# Patient Record
Sex: FEMALE | Race: WHITE | NOT HISPANIC OR LATINO | Employment: UNEMPLOYED | URBAN - NONMETROPOLITAN AREA
[De-identification: names, ages, dates, MRNs, and addresses within clinical notes are randomized per-mention and may not be internally consistent; named-entity substitution may affect disease eponyms.]

---

## 2019-11-21 ENCOUNTER — OFFICE VISIT (OUTPATIENT)
Dept: CARDIOLOGY | Facility: CLINIC | Age: 54
End: 2019-11-21

## 2019-11-21 VITALS
HEART RATE: 76 BPM | WEIGHT: 265 LBS | HEIGHT: 65 IN | BODY MASS INDEX: 44.15 KG/M2 | DIASTOLIC BLOOD PRESSURE: 81 MMHG | SYSTOLIC BLOOD PRESSURE: 125 MMHG | OXYGEN SATURATION: 98 %

## 2019-11-21 DIAGNOSIS — I10 ESSENTIAL HYPERTENSION: ICD-10-CM

## 2019-11-21 DIAGNOSIS — R05.9 COUGH: ICD-10-CM

## 2019-11-21 DIAGNOSIS — R53.82 CHRONIC FATIGUE: ICD-10-CM

## 2019-11-21 DIAGNOSIS — R00.2 PALPITATIONS: ICD-10-CM

## 2019-11-21 DIAGNOSIS — R07.2 PRECORDIAL PAIN: Primary | ICD-10-CM

## 2019-11-21 DIAGNOSIS — R06.02 SHORTNESS OF BREATH: ICD-10-CM

## 2019-11-21 PROCEDURE — 93000 ELECTROCARDIOGRAM COMPLETE: CPT | Performed by: NURSE PRACTITIONER

## 2019-11-21 PROCEDURE — 99203 OFFICE O/P NEW LOW 30 MIN: CPT | Performed by: NURSE PRACTITIONER

## 2019-11-21 RX ORDER — NITROGLYCERIN 0.4 MG/1
TABLET SUBLINGUAL
Qty: 30 TABLET | Refills: 5 | Status: SHIPPED | OUTPATIENT
Start: 2019-11-21

## 2019-11-21 RX ORDER — LEVOTHYROXINE SODIUM 175 UG/1
CAPSULE ORAL
COMMUNITY
End: 2019-11-21

## 2019-11-21 RX ORDER — ESCITALOPRAM OXALATE 10 MG/1
TABLET ORAL
Refills: 2 | COMMUNITY
Start: 2019-10-25 | End: 2020-01-15 | Stop reason: SDUPTHER

## 2019-11-21 RX ORDER — TIZANIDINE 4 MG/1
TABLET ORAL EVERY 6 HOURS
COMMUNITY
End: 2019-11-21

## 2019-11-21 RX ORDER — TIZANIDINE 4 MG/1
TABLET ORAL
Refills: 0 | COMMUNITY
Start: 2019-09-16 | End: 2019-11-21

## 2019-11-21 RX ORDER — HYDROXYZINE HYDROCHLORIDE 10 MG/1
TABLET, FILM COATED ORAL
Refills: 2 | COMMUNITY
Start: 2019-10-25

## 2019-11-21 RX ORDER — LEVOTHYROXINE SODIUM 175 UG/1
175 TABLET ORAL DAILY
Refills: 5 | COMMUNITY
Start: 2019-10-25

## 2019-11-21 RX ORDER — FUROSEMIDE 40 MG/1
TABLET ORAL
Refills: 5 | COMMUNITY
Start: 2019-10-25

## 2019-11-21 RX ORDER — ISOSORBIDE MONONITRATE 30 MG/1
30 TABLET, EXTENDED RELEASE ORAL DAILY
Qty: 30 TABLET | Refills: 11 | Status: SHIPPED | OUTPATIENT
Start: 2019-11-21 | End: 2020-01-15

## 2019-11-21 NOTE — PROGRESS NOTES
Subjective     Dara Lunsford is a 54 y.o. female who presents to day for Fatigue (x one yr, worse the past 4-5 months) and Abnormal ECG (per Cathryn Solano MD).    CHIEF COMPLIANT  Chief Complaint   Patient presents with   • Fatigue     x one yr, worse the past 4-5 months   • Abnormal ECG     per Cathryn Solano MD     Problem List  Fatigue  Dizziness  Chest Pain      Active Problems:  Problem List Items Addressed This Visit     None      Visit Diagnoses     Precordial pain    -  Primary    Relevant Medications    nitroglycerin (NITROSTAT) 0.4 MG SL tablet    isosorbide mononitrate (IMDUR) 30 MG 24 hr tablet    Other Relevant Orders    Stress Test With Myocardial Perfusion One Day    Adult Transthoracic Echo Complete W/ Cont if Necessary Per Protocol    Holter Monitor - 24 Hour    Palpitations        Relevant Orders    Stress Test With Myocardial Perfusion One Day    Adult Transthoracic Echo Complete W/ Cont if Necessary Per Protocol    Holter Monitor - 24 Hour    Essential hypertension        Relevant Medications    furosemide (LASIX) 40 MG tablet    Other Relevant Orders    Stress Test With Myocardial Perfusion One Day    Adult Transthoracic Echo Complete W/ Cont if Necessary Per Protocol    Holter Monitor - 24 Hour    Shortness of breath        Relevant Orders    Stress Test With Myocardial Perfusion One Day    Adult Transthoracic Echo Complete W/ Cont if Necessary Per Protocol    Holter Monitor - 24 Hour    Chronic fatigue        Relevant Orders    Stress Test With Myocardial Perfusion One Day    Adult Transthoracic Echo Complete W/ Cont if Necessary Per Protocol    Holter Monitor - 24 Hour    Cough        Relevant Orders    Stress Test With Myocardial Perfusion One Day    Adult Transthoracic Echo Complete W/ Cont if Necessary Per Protocol    Holter Monitor - 24 Hour          HPI  HPI  Ms. Lunsford is a 54-year-old female who is being seen today for increasing fatigue and abnormal EKG.  Patient stated that she  "has no energy for the last 4 to 5 months and that her fatigue has become significant amount worse.  States that she does falls asleep if she is sitting still.  States that she even has to make herself take a shower and that she can even make it to the store anymore.  Complains of midsternal chest pain that is stat and feels as if there is a block on her chest.  States that appetite but he makes it worse.  And at times \"water swishing in my chest\" nothing seems to make the pain better when it occurs.  Pain does occur daily.  Also adds that she has shortness of breath is been present for years and occurs sometimes at rest.  States that when she is walking feels like not getting nowhere and as if she is going to pass out.  Also for the past 3 to 4 months she verbalized that she has become nauseated without vomiting and has a decreased appetite.  States that she has muscle cramps really bad now in the left side of her chest cramps and having to stop and even had to pull her car over at one point in time and stand up and stretch to try to alleviate the cramps.  Her leg swelling does not go away at night says that she is been having this for years and she has been on Lasix.  In addition she has been having a cough and she coughs to the point where she becomes near syncopal.  She does have a productive clear to green sputum.  Has a history of sleep apnea in which she does not use a CPAP for she has been followed by Dr. Hall.  She was diagnosed with PD and ever since everything seems to be getting worse.  Denies any PND, orthopnea, syncope, or other neurological changes.    PRIOR MEDS  Current Outpatient Medications on File Prior to Visit   Medication Sig Dispense Refill   • escitalopram (LEXAPRO) 10 MG tablet one tablet daily  2   • furosemide (LASIX) 40 MG tablet TAKE ONE TABLET PO PRN  5   • hydrOXYzine (ATARAX) 10 MG tablet take one tablet daily  2   • levothyroxine (SYNTHROID, LEVOTHROID) 175 MCG tablet Take 175 mcg " "by mouth Daily.  5     No current facility-administered medications on file prior to visit.        ALLERGIES  Penicillins    HISTORY  Past Medical History:   Diagnosis Date   • Anxiety    • Hypothyroidism    • Stroke (cerebrum) (CMS/[facility]) 2016   • TIA (transient ischemic attack)        Social History     Socioeconomic History   • Marital status:      Spouse name: Not on file   • Number of children: Not on file   • Years of education: Not on file   • Highest education level: Not on file   Tobacco Use   • Smoking status: Never Smoker   • Smokeless tobacco: Never Used   Substance and Sexual Activity   • Alcohol use: No     Frequency: Never   • Drug use: No   • Sexual activity: Defer       Family History   Problem Relation Age of Onset   • Heart disease Mother    • Heart disease Father    • Heart disease Sister    • Heart disease Brother        Review of Systems   Constitutional: Positive for fatigue (worse the past 4-5 months).   Respiratory: Positive for cough, chest tightness and shortness of breath.    Cardiovascular: Positive for chest pain, palpitations and leg swelling.   Gastrointestinal: Positive for nausea (x 3-4 months). Negative for abdominal pain, blood in stool, constipation, diarrhea and vomiting.   Genitourinary: Positive for frequency. Negative for difficulty urinating, dysuria, hematuria and urgency.   Musculoskeletal: Positive for back pain and neck pain.   Neurological: Positive for dizziness and light-headedness. Negative for syncope.   Hematological: Bruises/bleeds easily.   Psychiatric/Behavioral: Positive for sleep disturbance (wakes with soa and cp, states happened this am).       Objective     VITALS: /81 (BP Location: Left arm, Patient Position: Sitting)   Pulse 76   Ht 165.1 cm (65\")   Wt 120 kg (265 lb)   SpO2 98%   BMI 44.10 kg/m²     LABS:   Lab Results (most recent)     None          IMAGING:   No Images in the past 120 days found..    EXAM:  Physical Exam "   Constitutional: She is oriented to person, place, and time. Vital signs are normal. She appears well-developed and well-nourished.   HENT:   Head: Normocephalic and atraumatic.   Eyes: EOM are normal. Pupils are equal, round, and reactive to light.   Neck: Trachea normal. Neck supple. No JVD present. Carotid bruit is not present. No thyromegaly present.   Cardiovascular: Normal rate, regular rhythm, normal heart sounds and intact distal pulses. Exam reveals no gallop and no friction rub.   No murmur heard.  Pulmonary/Chest: Effort normal and breath sounds normal. No respiratory distress.   Abdominal: Soft. Bowel sounds are normal. She exhibits no distension and no abdominal bruit. There is no tenderness. There is no guarding.   Musculoskeletal: Normal range of motion. She exhibits edema (trace).   Neurological: She is alert and oriented to person, place, and time. She has normal strength. No cranial nerve deficit or sensory deficit.   Skin: Skin is warm, dry and intact.   Psychiatric: She has a normal mood and affect. Her behavior is normal. Judgment and thought content normal.   Nursing note and vitals reviewed.      Procedure     ECG 12 Lead  Date/Time: 11/21/2019 12:24 PM  Performed by: Guido Valentino APRN  Authorized by: Guido Valentino APRN   Comparison: compared with previous ECG from 10/25/2019  Similar to previous ECG  Rhythm: sinus rhythm  Rate: normal  BPM: 66  ST Depression: V5 and V6  QRS axis: normal  Other findings: non-specific ST-T wave changes    Clinical impression: abnormal EKG               Assessment/Plan    Diagnosis Plan   1. Precordial pain  Stress Test With Myocardial Perfusion One Day    Adult Transthoracic Echo Complete W/ Cont if Necessary Per Protocol    Holter Monitor - 24 Hour    nitroglycerin (NITROSTAT) 0.4 MG SL tablet    isosorbide mononitrate (IMDUR) 30 MG 24 hr tablet   2. Palpitations  Stress Test With Myocardial Perfusion One Day    Adult Transthoracic Echo Complete W/  Cont if Necessary Per Protocol    Holter Monitor - 24 Hour   3. Essential hypertension  Stress Test With Myocardial Perfusion One Day    Adult Transthoracic Echo Complete W/ Cont if Necessary Per Protocol    Holter Monitor - 24 Hour   4. Shortness of breath  Stress Test With Myocardial Perfusion One Day    Adult Transthoracic Echo Complete W/ Cont if Necessary Per Protocol    Holter Monitor - 24 Hour   5. Chronic fatigue  Stress Test With Myocardial Perfusion One Day    Adult Transthoracic Echo Complete W/ Cont if Necessary Per Protocol    Holter Monitor - 24 Hour   6. Cough  Stress Test With Myocardial Perfusion One Day    Adult Transthoracic Echo Complete W/ Cont if Necessary Per Protocol    Holter Monitor - 24 Hour   1.  Patient had a significant increase in fatigue, chest pain, and shortness of air over the last 4 to 5 months.  Due to patient's comorbidities and symptoms I feel is reasonable to have patient undergo stress and echo.  Procedures explained to patient and patient wishes to proceed with test.  2.  We will place patient on isosorbide 30 mg daily in order to help reduce angina.  We will also prescribe nitroglycerin 0.4 mg sublingual up to 3 doses 5 minutes apart patient also advised to go to the emergency department immediately if pain is not relieved by nitroglycerin by third dose.  3.  Will order Holter monitor in order to determine etiology of palpitations.  Patient had lab work performed at Dr. Solano a couple of weeks ago.  We will try to obtain those labs to further evaluate essential cause of palpitations.  4.  Patient informed of signs and symptoms of ACS and advised to seek emergent treatment for any new worsening symptoms.  Patient also advised to seek sooner follow-up as needed.    Labs from [facility] on 10/25/2019 was reviewed and identified a relatively normal CBC, mildly elevated creatinine at 1.4, and elevated total cholesterol 220, triglycerides of 127, HDL 49, LDL  of 146.6, hemoglobin A1c of 5.5, BNP 24.1 and a extremely elevated TSH of 152.3.    Return in about 3 months (around 2/21/2020), or if symptoms worsen or fail to improve.    Dara was seen today for fatigue and abnormal ecg.    Diagnoses and all orders for this visit:    Precordial pain  -     Stress Test With Myocardial Perfusion One Day; Future  -     Adult Transthoracic Echo Complete W/ Cont if Necessary Per Protocol; Future  -     Holter Monitor - 24 Hour; Future  -     nitroglycerin (NITROSTAT) 0.4 MG SL tablet; 1 under the tongue as needed for angina, may repeat q5mins for up three doses  -     isosorbide mononitrate (IMDUR) 30 MG 24 hr tablet; Take 1 tablet by mouth Daily.    Palpitations  -     Stress Test With Myocardial Perfusion One Day; Future  -     Adult Transthoracic Echo Complete W/ Cont if Necessary Per Protocol; Future  -     Holter Monitor - 24 Hour; Future    Essential hypertension  -     Stress Test With Myocardial Perfusion One Day; Future  -     Adult Transthoracic Echo Complete W/ Cont if Necessary Per Protocol; Future  -     Holter Monitor - 24 Hour; Future    Shortness of breath  -     Stress Test With Myocardial Perfusion One Day; Future  -     Adult Transthoracic Echo Complete W/ Cont if Necessary Per Protocol; Future  -     Holter Monitor - 24 Hour; Future    Chronic fatigue  -     Stress Test With Myocardial Perfusion One Day; Future  -     Adult Transthoracic Echo Complete W/ Cont if Necessary Per Protocol; Future  -     Holter Monitor - 24 Hour; Future    Cough  -     Stress Test With Myocardial Perfusion One Day; Future  -     Adult Transthoracic Echo Complete W/ Cont if Necessary Per Protocol; Future  -     Holter Monitor - 24 Hour; Future                  Patient's Body mass index is 44.1 kg/m². BMI is above normal parameters. Recommendations include: educational material.           MEDS ORDERED DURING VISIT:  New Medications Ordered This Visit   Medications   • nitroglycerin  (NITROSTAT) 0.4 MG SL tablet     Si under the tongue as needed for angina, may repeat q5mins for up three doses     Dispense:  30 tablet     Refill:  5   • isosorbide mononitrate (IMDUR) 30 MG 24 hr tablet     Sig: Take 1 tablet by mouth Daily.     Dispense:  30 tablet     Refill:  11           This document has been electronically signed by Guido Valentino Jr., APRN  2019 10:35 PM

## 2019-11-21 NOTE — PATIENT INSTRUCTIONS
"Fat and Cholesterol Restricted Eating Plan  Getting too much fat and cholesterol in your diet may cause health problems. Choosing the right foods helps keep your fat and cholesterol at normal levels. This can keep you from getting certain diseases.  Your doctor may recommend an eating plan that includes:  · Total fat: ______% or less of total calories a day.  · Saturated fat: ______% or less of total calories a day.  · Cholesterol: less than _________mg a day.  · Fiber: ______g a day.  What are tips for following this plan?  Meal planning  · At meals, divide your plate into four equal parts:  ? Fill one-half of your plate with vegetables and green salads.  ? Fill one-fourth of your plate with whole grains.  ? Fill one-fourth of your plate with low-fat (lean) protein foods.  · Eat fish that is high in omega-3 fats at least two times a week. This includes mackerel, tuna, sardines, and salmon.  · Eat foods that are high in fiber, such as whole grains, beans, apples, broccoli, carrots, peas, and barley.  General tips    · Work with your doctor to lose weight if you need to.  · Avoid:  ? Foods with added sugar.  ? Fried foods.  ? Foods with partially hydrogenated oils.  · Limit alcohol intake to no more than 1 drink a day for nonpregnant women and 2 drinks a day for men. One drink equals 12 oz of beer, 5 oz of wine, or 1½ oz of hard liquor.  Reading food labels  · Check food labels for:  ? Trans fats.  ? Partially hydrogenated oils.  ? Saturated fat (g) in each serving.  ? Cholesterol (mg) in each serving.  ? Fiber (g) in each serving.  · Choose foods with healthy fats, such as:  ? Monounsaturated fats.  ? Polyunsaturated fats.  ? Omega-3 fats.  · Choose grain products that have whole grains. Look for the word \"whole\" as the first word in the ingredient list.  Cooking  · Cook foods using low-fat methods. These include baking, boiling, grilling, and broiling.  · Eat more home-cooked foods. Eat at restaurants and buffets " less often.  · Avoid cooking using saturated fats, such as butter, cream, palm oil, palm kernel oil, and coconut oil.  Recommended foods    Fruits  · All fresh, canned (in natural juice), or frozen fruits.  Vegetables  · Fresh or frozen vegetables (raw, steamed, roasted, or grilled). Green salads.  Grains  · Whole grains, such as whole wheat or whole grain breads, crackers, cereals, and pasta. Unsweetened oatmeal, bulgur, barley, quinoa, or brown rice. Corn or whole wheat flour tortillas.  Meats and other protein foods  · Ground beef (85% or leaner), grass-fed beef, or beef trimmed of fat. Skinless chicken or turkey. Ground chicken or turkey. Pork trimmed of fat. All fish and seafood. Egg whites. Dried beans, peas, or lentils. Unsalted nuts or seeds. Unsalted canned beans. Nut butters without added sugar or oil.  Dairy  · Low-fat or nonfat dairy products, such as skim or 1% milk, 2% or reduced-fat cheeses, low-fat and fat-free ricotta or cottage cheese, or plain low-fat and nonfat yogurt.  Fats and oils  · Tub margarine without trans fats. Light or reduced-fat mayonnaise and salad dressings. Avocado. Olive, canola, sesame, or safflower oils.  The items listed above may not be a complete list of foods and beverages you can eat. Contact a dietitian for more information.  Foods to avoid  Fruits  · Canned fruit in heavy syrup. Fruit in cream or butter sauce. Fried fruit.  Vegetables  · Vegetables cooked in cheese, cream, or butter sauce. Fried vegetables.  Grains  · White bread. White pasta. White rice. Cornbread. Bagels, pastries, and croissants. Crackers and snack foods that contain trans fat and hydrogenated oils.  Meats and other protein foods  · Fatty cuts of meat. Ribs, chicken wings, garcia, sausage, bologna, salami, chitterlings, fatback, hot dogs, bratwurst, and packaged lunch meats. Liver and organ meats. Whole eggs and egg yolks. Chicken and turkey with skin. Fried meat.  Dairy  · Whole or 2% milk, cream,  half-and-half, and cream cheese. Whole milk cheeses. Whole-fat or sweetened yogurt. Full-fat cheeses. Nondairy creamers and whipped toppings. Processed cheese, cheese spreads, and cheese curds.  Beverages  · Alcohol. Sugar-sweetened drinks such as sodas, lemonade, and fruit drinks.  Fats and oils  · Butter, stick margarine, lard, shortening, ghee, or garcia fat. Coconut, palm kernel, and palm oils.  Sweets and desserts  · Corn syrup, sugars, honey, and molasses. Candy. Jam and jelly. Syrup. Sweetened cereals. Cookies, pies, cakes, donuts, muffins, and ice cream.  The items listed above may not be a complete list of foods and beverages you should avoid. Contact a dietitian for more information.  Summary  · Choosing the right foods helps keep your fat and cholesterol at normal levels. This can keep you from getting certain diseases.  · At meals, fill one-half of your plate with vegetables and green salads.  · Eat high-fiber foods, like whole grains, beans, apples, carrots, peas, and barley.  · Limit added sugar, saturated fats, alcohol, and fried foods.  This information is not intended to replace advice given to you by your health care provider. Make sure you discuss any questions you have with your health care provider.  Document Released: 06/18/2013 Document Revised: 08/21/2019 Document Reviewed: 09/04/2018  [employer] Interactive Patient Education © 2019 [employer]  BMI for Adults    Body mass index (BMI) is a number that is calculated from a person's weight and height. BMI may help to estimate how much of a person's weight is composed of fat. BMI can help identify those who may be at higher risk for certain medical problems.  How is BMI used with adults?  BMI is used as a screening tool to identify possible weight problems. It is used to check whether a person is obese, overweight, healthy weight, or underweight.  How is BMI calculated?  BMI measures your weight and compares it to your height. This can be done  "either in English ([place] or metric measurements. Note that charts are available to help you find your BMI quickly and easily without having to do these calculations yourself.  To calculate your BMI in English ([place] measurements, your health care provider will:  1. Measure your weight in pounds (lb).  2. Multiply the number of pounds by 703.  ? For example, for a person who weighs 180 lb, multiply that number by 703, which equals 126,540.  3. Measure your height in inches (in). Then multiply that number by itself to get a measurement called \"inches squared.\"  ? For example, for a person who is 70 in tall, the \"inches squared\" measurement is 70 in x 70 in, which equals 4900 inches squared.  4. Divide the total from Step 2 (number of lb x 703) by the total from Step 3 (inches squared): 126,540 ÷ 4900 = 25.8. This is your BMI.  To calculate your BMI in metric measurements, your health care provider will:  1. Measure your weight in kilograms (kg).  2. Measure your height in meters (m). Then multiply that number by itself to get a measurement called \"meters squared.\"  ? For example, for a person who is 1.75 m tall, the \"meters squared\" measurement is 1.75 m x 1.75 m, which is equal to 3.1 meters squared.  3. Divide the number of kilograms (your weight) by the meters squared number. In this example: 70 ÷ 3.1 = 22.6. This is your BMI.  How is BMI interpreted?  To interpret your results, your health care provider will use BMI charts to identify whether you are underweight, normal weight, overweight, or obese. The following guidelines will be used:  · Underweight: BMI less than 18.5.  · Normal weight: BMI between 18.5 and 24.9.  · Overweight: BMI between 25 and 29.9.  · Obese: BMI of 30 and above.  Please note:  · Weight includes both fat and muscle, so someone with a muscular build, such as an athlete, may have a BMI that is higher than 24.9. In cases like these, BMI is not an accurate measure of body fat.  · To determine " if excess body fat is the cause of a BMI of 25 or higher, further assessments may need to be done by a health care provider.  · BMI is usually interpreted in the same way for men and women.  Why is BMI a useful tool?  BMI is useful in two ways:  · Identifying a weight problem that may be related to a medical condition, or that may increase the risk for medical problems.  · Promoting lifestyle and diet changes in order to reach a healthy weight.  Summary  · Body mass index (BMI) is a number that is calculated from a person's weight and height.  · BMI may help to estimate how much of a person's weight is composed of fat. BMI can help identify those who may be at higher risk for certain medical problems.  · BMI can be measured using English measurements or metric measurements.  · To interpret your results, your health care provider will use BMI charts to identify whether you are underweight, normal weight, overweight, or obese.  This information is not intended to replace advice given to you by your health care provider. Make sure you discuss any questions you have with your health care provider.  Document Released: 08/29/2005 Document Revised: 10/31/2018 Document Reviewed: 10/31/2018  [employer] Interactive Patient Education © 2019 [employer]    Acute Coronary Syndrome    Acute coronary syndrome (ACS) is a serious problem in which there is suddenly not enough blood and oxygen reaching the heart. ACS can result in chest pain or a heart attack.  This condition is a medical emergency. If you have any symptoms of this condition, get help right away.  What are the causes?  This condition may be caused by:  · Buildup of fat and cholesterol inside of the arteries (atherosclerosis). This is the most common cause. The buildup (plaque) can cause blood vessels in the heart (coronary arteries) to become narrow or blocked, which reduces blood flow to the heart. Plaque can also break off and lead to a clot, which can block an  artery and cause a heart attack or stroke.  · Sudden tightening of the muscles around the coronary arteries (coronary spasm).  · Tearing of a coronary artery (spontaneous coronary artery dissection).  · Very low blood pressure (hypotension).  · An abnormal heartbeat (arrhythmia).  · Other medical conditions that cause a decrease of oxygen to the heart, such as anemiaorrespiratory failure.  · Using cocaine or methamphetamine.  What increases the risk?  The following factors may make you more likely to develop this condition:  · Age. The risk for ACS increases as you get older.  · History of chest pain, heart attack, peripheral artery disease, or stroke.  · Having taken chemotherapy or immune-suppressing medicines.  · Being male.  · Family history of chest pain, heart disease, or stroke.  · Smoking.  · Not exercising enough.  · Being overweight.  · High cholesterol.  · High blood pressure (hypertension).  · Diabetes.  · Excessive alcohol use.  What are the signs or symptoms?  Common symptoms of this condition include:  · Chest pain. The pain may last a long time, or it may stop and come back (recur). It may feel like:  ? Crushing or squeezing.  ? Tightness, pressure, fullness, or heaviness.  · Arm, neck, jaw, or back pain.  · Heartburn or indigestion.  · Shortness of breath.  · Nausea.  · Sudden cold sweats.  · Light-headedness.  · Dizziness, or passing out.  · Tiredness (fatigue).  Sometimes there are no symptoms.  How is this diagnosed?  This condition may be diagnosed based on:  · Your medical history and symptoms.  · An electrocardiogram (ECG). This imaging test measures the heart's electrical activity.  · Blood tests. Cardiac blood tests may need to be repeated at designated time intervals.  · Chest X-ray.  · A CT scan of the chest.  · A coronary angiogram. This is a procedure in which dye is injected into the bloodstream and then X-rays are taken to show if there is a blockage in a coronary artery.  · Exercise  stress testing.  · Echocardiography. This is a test that uses sound waves to produce detailed images of the heart.  How is this treated?  The treatment is to restore blood flow to the heart as soon as possible. Treatment for this condition may include:  · Oxygen therapy.  · Medicines, such as:  ? Antiplatelet medicines and blood-thinning medicines, such as aspirin. These help prevent blood clots.  ? Medicine that dissolves any blood clots (fibrinolytic therapy).  ? Blood pressure medicines.  ? Nitroglycerin. This helps relieve chest pain and widens blood vessels to improve blood flow.  ? Pain medicine.  ? Cholesterol-lowering medicine.  · Surgery, such as:  ? Coronary angioplasty with stent placement. This involves placing a small piece of metal that looks like mesh or a spring into a narrow coronary artery. This widens the artery and keep it open.  ? Coronary artery bypass surgery. This involves taking a section of a blood vessel from a different part of your body, and placing it on the blocked coronary artery to allow blood to flow around (bypass) the blockage.  · Cardiac rehabilitation. This is a program that helps improve your health and well-being. It includes exercise training, education, and counseling to help you recover.  Follow these instructions at home:  Eating and drinking  · Eat a heart-healthy diet that includes whole grains, fruits and vegetables, lean proteins, and low-fat or nonfat dairy products.  · Limit how much salt (sodium) you eat as told by your health care provider. Follow instructions from your health care provider about any other eating or drinking restrictions, such as limiting foods that are high in fat and processed sugars.  · Use healthy cooking methods such as roasting, grilling, broiling, baking, poaching, steaming, or stir-frying.  · Talk with a dietitian to learn about healthy cooking methods and how to eat less sodium.  Medicines  · Take over-the-counter and prescription  medicines only as told by your health care provider.  · Do not take these medicines unless your health care provider approves:  ? Vitamin supplements that contain vitamin A or vitamin E.  ? Nonsteroidal anti-inflammatory drugs (NSAIDs), such as ibuprofen, naproxen, or celecoxib.  ? Hormone replacement therapy that contains estrogen.  If you are taking blood thinners:  · Talk with your health care provider before you take any medicines that contain aspirin or NSAIDs. These medicines increase your risk for dangerous bleeding.  · Take your medicine exactly as told, at the same time every day.  · Avoid activities that could cause injury or bruising, and follow instructions about how to prevent falls.  · Wear a medical alert bracelet, and carry a card that lists what medicines you take.  Activity  · Join a cardiac rehabilitation program. An exercise plan will be developed for you.  · Ask your health care provider:  ? What activities and exercises are safe for you.  ? If you should follow specific instructions about lifting, driving, or climbing stairs.  Lifestyle  · Do not use any products that contain nicotine or tobacco, such as cigarettes and e-cigarettes. If you need help quitting, ask your health care provider.  · If your health care provider says that alcohol is safe for you, limit your alcohol intake to no more than 1 drink a day. One drink equals 12 oz of beer, 5 oz of wine, or 1½ oz of hard liquor.  · Maintain a healthy weight. If you need to lose weight, work with your health care provider to do so safely.  General instructions  · Tell all the health care providers who care for you about your heart condition, including your dentist. This may affect the medicines or treatment you receive.  · Manage any other health conditions you have, such as hypertension or diabetes. These conditions affect your heart.  · Learn ways to manage stress.  · Get screened for depression, and get mental health treatment if you need  it. People with ACS are at higher risk for depression.  · Keep your vaccinations up to date. Get the flu shot (influenza vaccine) every year.  · If directed, monitor your blood pressure at home.  · Keep all follow-up visits as told by your health care provider. This is important.  Contact a health care provider if:  · You feel overwhelmed or sad.  · You have trouble doing your daily activities.  Get help right away if:  · You have pain in your chest, neck, arm, jaw, stomach, or back that recurs, and:  ? It lasts for more than a few minutes.  ? It is not relieved by taking the medicineyour health care provider prescribed.  · You have unexplained:  ? Heavy sweating.  ? Heartburn or indigestion.  ? Nausea or vomiting.  ? Shortness of breath.  ? Difficulty breathing.  ? Fatigue.  ? Nervousness or anxiety.  ? Weakness.  ? Diarrhea.  ? Dark stools or blood in your stool.  · You have sudden light-headedness or dizziness.  · Your blood pressure is higher than 180/120.  · You faint.  · You have thoughts about hurting yourself.  These symptoms may represent a serious problem that is an emergency. Do not wait to see if the symptoms will go away. Get medical help right away. Call your local emergency services (134 in the [place] Do not drive yourself to the hospital.   If you ever feel like you may hurt yourself or others, or have thoughts about taking your own life, get help right away. You can go to your nearest emergency department or call:  · Emergency services (583 in the [place]  · A suicide crisis helpline, such as the National Suicide Prevention Lifeline at 1-982.711.2309. This is open 24 hours a day.  Summary  · Acute coronary syndrome (ACS) is when there is not enough blood and oxygen being supplied to the heart. ACS can result in chest pain or a heart attack.  · Acute coronary syndrome is a medical emergency. If you have any symptoms of this condition, get help right away.  · Treatment includes medicines and procedures  to open the blocked arteries and restore blood flow.  This information is not intended to replace advice given to you by your health care provider. Make sure you discuss any questions you have with your health care provider.  Document Released: 12/18/2006 Document Revised: 08/28/2018 Document Reviewed: 08/28/2018  [employer] Interactive Patient Education © 2019 [employer]

## 2019-12-11 ENCOUNTER — APPOINTMENT (OUTPATIENT)
Dept: CARDIOLOGY | Facility: HOSPITAL | Age: 54
End: 2019-12-11

## 2019-12-11 ENCOUNTER — HOSPITAL ENCOUNTER (OUTPATIENT)
Dept: CARDIOLOGY | Facility: HOSPITAL | Age: 54
End: 2019-12-11

## 2020-01-02 ENCOUNTER — HOSPITAL ENCOUNTER (OUTPATIENT)
Dept: CARDIOLOGY | Facility: HOSPITAL | Age: 55
Discharge: HOME | End: 2020-01-02

## 2020-01-02 VITALS — HEIGHT: 65 IN | WEIGHT: 264.55 LBS | BODY MASS INDEX: 44.08 KG/M2

## 2020-01-02 DIAGNOSIS — R53.82 CHRONIC FATIGUE: ICD-10-CM

## 2020-01-02 DIAGNOSIS — R00.2 PALPITATIONS: ICD-10-CM

## 2020-01-02 DIAGNOSIS — I10 ESSENTIAL HYPERTENSION: ICD-10-CM

## 2020-01-02 DIAGNOSIS — R06.02 SHORTNESS OF BREATH: ICD-10-CM

## 2020-01-02 DIAGNOSIS — R05.9 COUGH: ICD-10-CM

## 2020-01-02 DIAGNOSIS — R07.2 PRECORDIAL PAIN: ICD-10-CM

## 2020-01-02 LAB
BH CV STRESS COMMENTS STAGE 1: NORMAL
BH CV STRESS DOSE REGADENOSON STAGE 1: 0.4
BH CV STRESS DURATION MIN STAGE 1: 0
BH CV STRESS DURATION SEC STAGE 1: 10
BH CV STRESS PROTOCOL 1: NORMAL
BH CV STRESS RECOVERY BP: NORMAL MMHG
BH CV STRESS RECOVERY HR: 70 BPM
BH CV STRESS STAGE 1: 1
MAXIMAL PREDICTED HEART RATE: 166 BPM
PERCENT MAX PREDICTED HR: 47.59 %
STRESS BASELINE BP: NORMAL MMHG
STRESS BASELINE HR: 57 BPM
STRESS PERCENT HR: 56 %
STRESS POST PEAK BP: NORMAL MMHG
STRESS POST PEAK HR: 79 BPM
STRESS TARGET HR: 141 BPM

## 2020-01-02 PROCEDURE — 93017 CV STRESS TEST TRACING ONLY: CPT

## 2020-01-02 PROCEDURE — 0 TECHNETIUM SESTAMIBI: Performed by: INTERNAL MEDICINE

## 2020-01-02 PROCEDURE — 25010000002 REGADENOSON 0.4 MG/5ML SOLUTION: Performed by: INTERNAL MEDICINE

## 2020-01-02 PROCEDURE — A9500 TC99M SESTAMIBI: HCPCS | Performed by: INTERNAL MEDICINE

## 2020-01-02 PROCEDURE — 93306 TTE W/DOPPLER COMPLETE: CPT | Performed by: INTERNAL MEDICINE

## 2020-01-02 PROCEDURE — 93016 CV STRESS TEST SUPVJ ONLY: CPT | Performed by: NURSE PRACTITIONER

## 2020-01-02 PROCEDURE — 78452 HT MUSCLE IMAGE SPECT MULT: CPT

## 2020-01-02 PROCEDURE — 93018 CV STRESS TEST I&R ONLY: CPT | Performed by: INTERNAL MEDICINE

## 2020-01-02 PROCEDURE — 93306 TTE W/DOPPLER COMPLETE: CPT

## 2020-01-02 PROCEDURE — 78452 HT MUSCLE IMAGE SPECT MULT: CPT | Performed by: INTERNAL MEDICINE

## 2020-01-02 RX ADMIN — TECHNETIUM TC 99M SESTAMIBI 1 DOSE: 1 INJECTION INTRAVENOUS at 11:05

## 2020-01-02 RX ADMIN — TECHNETIUM TC 99M SESTAMIBI 1 DOSE: 1 INJECTION INTRAVENOUS at 11:14

## 2020-01-02 RX ADMIN — REGADENOSON 0.4 MG: 0.08 INJECTION, SOLUTION INTRAVENOUS at 11:14

## 2020-01-05 LAB
AORTIC DIMENSIONLESS INDEX: 0.9 (DI)
BH CV ECHO MEAS - ACS: 2 CM
BH CV ECHO MEAS - AO MAX PG (FULL): 0.36 MMHG
BH CV ECHO MEAS - AO MAX PG: 3.4 MMHG
BH CV ECHO MEAS - AO MEAN PG (FULL): 1 MMHG
BH CV ECHO MEAS - AO MEAN PG: 2 MMHG
BH CV ECHO MEAS - AO ROOT AREA (BSA CORRECTED): 1.5
BH CV ECHO MEAS - AO ROOT AREA: 8.6 CM^2
BH CV ECHO MEAS - AO ROOT DIAM: 3.3 CM
BH CV ECHO MEAS - AO V2 MAX: 91.8 CM/SEC
BH CV ECHO MEAS - AO V2 MEAN: 59.5 CM/SEC
BH CV ECHO MEAS - AO V2 VTI: 21.8 CM
BH CV ECHO MEAS - BSA(HAYCOCK): 2.4 M^2
BH CV ECHO MEAS - BSA: 2.2 M^2
BH CV ECHO MEAS - BZI_BMI: 44.1 KILOGRAMS/M^2
BH CV ECHO MEAS - BZI_METRIC_HEIGHT: 165.1 CM
BH CV ECHO MEAS - BZI_METRIC_WEIGHT: 120.2 KG
BH CV ECHO MEAS - EDV(CUBED): 60.7 ML
BH CV ECHO MEAS - EDV(MOD-SP4): 88.6 ML
BH CV ECHO MEAS - EDV(TEICH): 67.1 ML
BH CV ECHO MEAS - EF(CUBED): 76.4 %
BH CV ECHO MEAS - EF(MOD-SP4): 53.2 %
BH CV ECHO MEAS - EF(TEICH): 69 %
BH CV ECHO MEAS - ESV(CUBED): 14.3 ML
BH CV ECHO MEAS - ESV(MOD-SP4): 41.5 ML
BH CV ECHO MEAS - ESV(TEICH): 20.8 ML
BH CV ECHO MEAS - FS: 38.2 %
BH CV ECHO MEAS - IVS/LVPW: 0.99
BH CV ECHO MEAS - IVSD: 0.85 CM
BH CV ECHO MEAS - LA DIMENSION(2D): 3.2 CM
BH CV ECHO MEAS - LA DIMENSION: 3.1 CM
BH CV ECHO MEAS - LA/AO: 0.94
BH CV ECHO MEAS - LAT PEAK E' VEL: 6.4 CM/SEC
BH CV ECHO MEAS - LV DIASTOLIC VOL/BSA (35-75): 39.7 ML/M^2
BH CV ECHO MEAS - LV IVRT: 0.12 SEC
BH CV ECHO MEAS - LV MASS(C)D: 99.4 GRAMS
BH CV ECHO MEAS - LV MASS(C)DI: 44.6 GRAMS/M^2
BH CV ECHO MEAS - LV MAX PG: 3 MMHG
BH CV ECHO MEAS - LV MEAN PG: 1 MMHG
BH CV ECHO MEAS - LV SYSTOLIC VOL/BSA (12-30): 18.6 ML/M^2
BH CV ECHO MEAS - LV V1 MAX: 86.8 CM/SEC
BH CV ECHO MEAS - LV V1 MEAN: 49.7 CM/SEC
BH CV ECHO MEAS - LV V1 VTI: 21.2 CM
BH CV ECHO MEAS - LVIDD: 3.9 CM
BH CV ECHO MEAS - LVIDS: 2.4 CM
BH CV ECHO MEAS - LVLD AP4: 7.7 CM
BH CV ECHO MEAS - LVLS AP4: 6 CM
BH CV ECHO MEAS - LVPWD: 0.86 CM
BH CV ECHO MEAS - MED PEAK E' VEL: 6.7 CM/SEC
BH CV ECHO MEAS - MV A MAX VEL: 59.8 CM/SEC
BH CV ECHO MEAS - MV DEC SLOPE: 402 CM/SEC^2
BH CV ECHO MEAS - MV DEC TIME: 0.22 SEC
BH CV ECHO MEAS - MV E MAX VEL: 73.2 CM/SEC
BH CV ECHO MEAS - MV E/A: 1.2
BH CV ECHO MEAS - MV MAX PG: 3.1 MMHG
BH CV ECHO MEAS - MV MEAN PG: 1 MMHG
BH CV ECHO MEAS - MV P1/2T MAX VEL: 97.6 CM/SEC
BH CV ECHO MEAS - MV P1/2T: 71.1 MSEC
BH CV ECHO MEAS - MV V2 MAX: 87.5 CM/SEC
BH CV ECHO MEAS - MV V2 MEAN: 49 CM/SEC
BH CV ECHO MEAS - MV V2 VTI: 29 CM
BH CV ECHO MEAS - MVA P1/2T LCG: 2.3 CM^2
BH CV ECHO MEAS - MVA(P1/2T): 3.1 CM^2
BH CV ECHO MEAS - PA MAX PG (FULL): 1.3 MMHG
BH CV ECHO MEAS - PA MAX PG: 3.1 MMHG
BH CV ECHO MEAS - PA MEAN PG (FULL): 1 MMHG
BH CV ECHO MEAS - PA MEAN PG: 2 MMHG
BH CV ECHO MEAS - PA V2 MAX: 87.5 CM/SEC
BH CV ECHO MEAS - PA V2 MEAN: 63 CM/SEC
BH CV ECHO MEAS - PA V2 VTI: 19.8 CM
BH CV ECHO MEAS - PI END-D VEL: 88.9 CM/SEC
BH CV ECHO MEAS - RAP SYSTOLE: 10 MMHG
BH CV ECHO MEAS - RV MAX PG: 1.8 MMHG
BH CV ECHO MEAS - RV MEAN PG: 1 MMHG
BH CV ECHO MEAS - RV V1 MAX: 66.6 CM/SEC
BH CV ECHO MEAS - RV V1 MEAN: 41.6 CM/SEC
BH CV ECHO MEAS - RV V1 VTI: 15.3 CM
BH CV ECHO MEAS - RVDD: 2.1 CM
BH CV ECHO MEAS - RVSP: 27 MMHG
BH CV ECHO MEAS - SI(AO): 83.6 ML/M^2
BH CV ECHO MEAS - SI(CUBED): 20.8 ML/M^2
BH CV ECHO MEAS - SI(MOD-SP4): 21.1 ML/M^2
BH CV ECHO MEAS - SI(TEICH): 20.8 ML/M^2
BH CV ECHO MEAS - SV(AO): 186.5 ML
BH CV ECHO MEAS - SV(CUBED): 46.3 ML
BH CV ECHO MEAS - SV(MOD-SP4): 47.1 ML
BH CV ECHO MEAS - SV(TEICH): 46.3 ML
BH CV ECHO MEAS - TR MAX VEL: 202 CM/SEC
BH CV ECHO MEASUREMENTS AVERAGE E/E' RATIO: 11.18
LEFT ATRIUM VOLUME INDEX: 20.7 ML/M2
LEFT ATRIUM VOLUME: 46.2 CM3
MAXIMAL PREDICTED HEART RATE: 166 BPM
STRESS TARGET HR: 141 BPM

## 2020-01-07 ENCOUNTER — TELEPHONE (OUTPATIENT)
Dept: CARDIOLOGY | Facility: CLINIC | Age: 55
End: 2020-01-07

## 2020-01-07 NOTE — TELEPHONE ENCOUNTER
Patient informed of stress and echo results, per JAHAIRA TELLO. Patient to see him next Wednesday Januray 15th @ 1:15 pm to further discuss results. Patient verbalized understanding. Haily Sandovalamanda BENAVIDES      ----- Message from ELISHA Warren sent at 1/5/2020  4:46 PM EST -----  Regarding: FW:  trivial TR and trivial to mild MR otherwise normal. Keep follow up  ----- Message -----  From: Jose Rafael Astorga MD  Sent: 1/5/2020   3:25 PM EST  To: ELISHA Warrne

## 2020-01-14 NOTE — TELEPHONE ENCOUNTER
"Clare requested that I contact pt w/ her test results.       I spoke w/ pt and asked if she had questions regd her echo and stress. She stated that she thought additional testing was done. I informed her that I only see echo and stress results, at this time. She requested that I got over results again. I went over the information in this telephone encounter and informed pt of her EF. She stated she was told that there might be an issue w/ blood flow.     Per chart review, stress test states:   \" mild concomitant anterior wall ischemic defect cannot be absolutely excluded and is suggested by raw tomograms.\"       I explained ischemia and the above. Stated to keep appt w/ JR tomorrow and that he will go over results in depth and answer any questions that she has regd her results. She verbalized understanding.   "

## 2020-01-15 ENCOUNTER — OFFICE VISIT (OUTPATIENT)
Dept: CARDIOLOGY | Facility: CLINIC | Age: 55
End: 2020-01-15

## 2020-01-15 VITALS
OXYGEN SATURATION: 97 % | SYSTOLIC BLOOD PRESSURE: 128 MMHG | DIASTOLIC BLOOD PRESSURE: 74 MMHG | WEIGHT: 272 LBS | HEIGHT: 65 IN | HEART RATE: 64 BPM | BODY MASS INDEX: 45.32 KG/M2

## 2020-01-15 DIAGNOSIS — R05.9 COUGH: ICD-10-CM

## 2020-01-15 DIAGNOSIS — F32.A DEPRESSION DETERMINED BY EXAMINATION: ICD-10-CM

## 2020-01-15 DIAGNOSIS — I51.89 GRADE I DIASTOLIC DYSFUNCTION: ICD-10-CM

## 2020-01-15 DIAGNOSIS — R07.2 PRECORDIAL PAIN: ICD-10-CM

## 2020-01-15 DIAGNOSIS — R45.1 AGITATION: ICD-10-CM

## 2020-01-15 DIAGNOSIS — R94.39 POSITIVE CARDIAC STRESS TEST: Primary | ICD-10-CM

## 2020-01-15 PROCEDURE — 99214 OFFICE O/P EST MOD 30 MIN: CPT | Performed by: NURSE PRACTITIONER

## 2020-01-15 RX ORDER — BENZONATATE 100 MG/1
100 CAPSULE ORAL 3 TIMES DAILY PRN
Qty: 30 CAPSULE | Refills: 1 | Status: SHIPPED | OUTPATIENT
Start: 2020-01-15

## 2020-01-15 RX ORDER — CLOPIDOGREL BISULFATE 75 MG/1
75 TABLET ORAL DAILY
Qty: 30 TABLET | Refills: 11 | Status: SHIPPED | OUTPATIENT
Start: 2020-01-15 | End: 2021-03-02

## 2020-01-15 RX ORDER — LISINOPRIL 2.5 MG/1
2.5 TABLET ORAL DAILY
Qty: 30 TABLET | Refills: 11 | Status: SHIPPED | OUTPATIENT
Start: 2020-01-15 | End: 2020-01-15

## 2020-01-15 RX ORDER — ESCITALOPRAM OXALATE 20 MG/1
20 TABLET ORAL DAILY
Qty: 30 TABLET | Refills: 11 | Status: SHIPPED | OUTPATIENT
Start: 2020-01-15

## 2020-01-15 RX ORDER — ISOSORBIDE MONONITRATE 60 MG/1
60 TABLET, EXTENDED RELEASE ORAL DAILY
Qty: 30 TABLET | Refills: 11 | Status: SHIPPED | OUTPATIENT
Start: 2020-01-15 | End: 2021-03-02

## 2020-01-15 NOTE — PROGRESS NOTES
Subjective     Dara Lunsford is a 54 y.o. female who presents to day for Chest Pain (testing 1-2-19) and Fatigue.    CHIEF COMPLIANT  Chief Complaint   Patient presents with   • Chest Pain     testing 1-2-19   • Fatigue       Active Problems:  Problem List Items Addressed This Visit     None      Visit Diagnoses     Positive cardiac stress test    -  Primary    Relevant Medications    clopidogrel (PLAVIX) 75 MG tablet    Other Relevant Orders    [facility]    Precordial pain        Relevant Medications    isosorbide mononitrate (IMDUR) 60 MG 24 hr tablet    clopidogrel (PLAVIX) 75 MG tablet    Other Relevant Orders    CBC & Differential    Basic Metabolic Panel    [facility]    Grade I diastolic dysfunction        Relevant Medications    clopidogrel (PLAVIX) 75 MG tablet    Other Relevant Orders    CBC & Differential    Basic Metabolic Panel    [facility]    Agitation        Relevant Medications    escitalopram (LEXAPRO) 20 MG tablet    Other Relevant Orders    CBC & Differential    Basic Metabolic Panel    Depression determined by examination        Relevant Medications    escitalopram (LEXAPRO) 20 MG tablet    Other Relevant Orders    CBC & Differential    Basic Metabolic Panel    Cough        Relevant Medications    benzonatate (TESSALON PERLES) 100 MG capsule    Other Relevant Orders    CBC & Differential    Basic Metabolic Panel          HPI  HPI  Mrs. Lunsford is a 54-year-old female who is being followed up today for her chest pain who underwent stress test and echocardiogram on 1/2/2020.  Patient's echocardiogram revealed diastolic dysfunction grade 1, trivial to mild mitral and trivial tricuspid regurgitation, and an ejection fraction of 61 to 65%.  Stress test identified marked chest wall attenuation.  A mild concomitant anterior wall ischemic defect cannot be absolutely excluded and is suggested by raw tomographs.  A preserved ejection fraction of 67% with no focal wall motion  "abnormality.  Patient continues to have significant chest pain that started approximately 1 year ago it continues to get worse.  Patient states that the pain is midsternal and goes to the left arm and down all the way to her hand.  States it also radiates into her left neck and into her face.  She also has this pain at rest as well as activity.  The pain is intermittent in nature and described as pressure as if someone was pushing in on her chest and it is swelling.  Activity does seem to make it worse including walking to the car.  No relieving factors have been able to be identified.  Associated symptoms include shortness of air, dizziness, cough, and diaphoresis.  Patient has tried breathing treatments for the shortness of breath with little to no relief.  Nitroglycerin however does seem to ease off the pain somewhat.  Patient states the severity of the pain becomes quite significant at times.  States that she has smothering when she is lying flat and inhalers do not help she does have a a persistent cough that goes on for hours.  She ends up gasping for air and becomes dizzy and lightheaded.  She also reports bilateral lower extremity edema which mainly occurs in her ankles.  She states at times \"I think I am going to die\" patient is also experiencing palpitations for the same period of time of 1 year as if her heart racing and is going to come out of her chest and then slow down.  During these episodes she has shortness of breath and is exacerbated by activity.  Also has a strong family history of coronary artery disease which her brother has had 2 myocardial infarctions by the age of 51.  She also reports having dizziness and lightheaded during coughing episodes and if changing positions too quickly.  symptom of fatigue to the point where she has to make herself do activities of daily living.  Patient does deny any syncope or neurological changes.  PRIOR MEDS  Current Outpatient Medications on File Prior to " Visit   Medication Sig Dispense Refill   • furosemide (LASIX) 40 MG tablet TAKE ONE TABLET PO PRN  5   • hydrOXYzine (ATARAX) 10 MG tablet take one tablet daily  2   • levothyroxine (SYNTHROID, LEVOTHROID) 175 MCG tablet Take 175 mcg by mouth Daily.  5   • nitroglycerin (NITROSTAT) 0.4 MG SL tablet 1 under the tongue as needed for angina, may repeat q5mins for up three doses 30 tablet 5   • [DISCONTINUED] escitalopram (LEXAPRO) 10 MG tablet one tablet daily  2   • [DISCONTINUED] isosorbide mononitrate (IMDUR) 30 MG 24 hr tablet Take 1 tablet by mouth Daily. 30 tablet 11     No current facility-administered medications on file prior to visit.        ALLERGIES  Penicillins and Latex    HISTORY  Past Medical History:   Diagnosis Date   • Anxiety    • Hypothyroidism    • Stroke (cerebrum) (CMS/[facility]) 2016   • TIA (transient ischemic attack)        Social History     Socioeconomic History   • Marital status:      Spouse name: Not on file   • Number of children: Not on file   • Years of education: Not on file   • Highest education level: Not on file   Tobacco Use   • Smoking status: Never Smoker   • Smokeless tobacco: Never Used   Substance and Sexual Activity   • Alcohol use: No     Frequency: Never   • Drug use: No   • Sexual activity: Defer       Family History   Problem Relation Age of Onset   • Heart disease Mother    • Heart disease Father    • Heart disease Sister    • Heart disease Brother        Review of Systems   Constitutional: Positive for fatigue. Negative for chills and fever.   HENT: Negative.  Negative for congestion.    Eyes: Positive for visual disturbance (readers).   Respiratory: Positive for apnea, chest tightness and shortness of breath.    Cardiovascular: Positive for chest pain, palpitations and leg swelling.   Gastrointestinal: Negative.  Negative for abdominal pain, blood in stool, diarrhea, nausea and vomiting.   Endocrine: Negative for heat intolerance.   Genitourinary: Negative.   "Negative for dysuria, frequency and urgency.   Musculoskeletal: Positive for back pain. Negative for neck pain.   Skin: Negative.  Negative for rash and wound.   Allergic/Immunologic: Positive for environmental allergies. Negative for food allergies.   Neurological: Positive for dizziness and light-headedness. Negative for syncope.   Hematological: Bruises/bleeds easily.   Psychiatric/Behavioral: Positive for sleep disturbance (wakes with soa and cp , does not use cpap usually).       Objective     VITALS: /74 (BP Location: Left arm, Patient Position: Sitting)   Pulse 64   Ht 165.1 cm (65\")   Wt 123 kg (272 lb)   SpO2 97%   BMI 45.26 kg/m²     LABS:   Lab Results (most recent)     None          IMAGING:   No Images in the past 120 days found..    EXAM:  Physical Exam   Constitutional: She is oriented to person, place, and time. She appears well-developed and well-nourished.   Eyes: Pupils are equal, round, and reactive to light. EOM are normal.   Neck: Trachea normal and phonation normal. Neck supple. No JVD present. Carotid bruit is not present. No thyroid mass present.   Cardiovascular: Normal rate, regular rhythm, normal heart sounds and intact distal pulses. Exam reveals no gallop and no friction rub.   No murmur heard.  Pulses:       Radial pulses are 2+ on the right side, and 2+ on the left side.        Posterior tibial pulses are 2+ on the right side, and 2+ on the left side.   Pulmonary/Chest: Effort normal and breath sounds normal. No respiratory distress. She has no wheezes. She has no rales.   Abdominal: Soft. Bowel sounds are normal. She exhibits no distension and no abdominal bruit. There is no tenderness.   Musculoskeletal: Normal range of motion.   Neurological: She is alert and oriented to person, place, and time. She has normal strength. No cranial nerve deficit or sensory deficit.   Skin: Skin is warm, dry and intact. Capillary refill takes less than 2 seconds. No rash noted.   "   Psychiatric: She has a normal mood and affect. Her speech is normal and behavior is normal. Judgment and thought content normal. Cognition and memory are normal.   Nursing note and vitals reviewed.      Procedure   Procedures       Assessment/Plan    Diagnosis Plan   1. Positive cardiac stress test  [facility]    clopidogrel (PLAVIX) 75 MG tablet   2. Precordial pain  isosorbide mononitrate (IMDUR) 60 MG 24 hr tablet    CBC & Differential    Basic Metabolic Panel    [facility]    clopidogrel (PLAVIX) 75 MG tablet   3. Grade I diastolic dysfunction  CBC & Differential    Basic Metabolic Panel    [facility]    clopidogrel (PLAVIX) 75 MG tablet   4. Agitation  escitalopram (LEXAPRO) 20 MG tablet    CBC & Differential    Basic Metabolic Panel   5. Depression determined by examination  escitalopram (LEXAPRO) 20 MG tablet    CBC & Differential    Basic Metabolic Panel   6. Cough  benzonatate (TESSALON PERLES) 100 MG capsule    CBC & Differential    Basic Metabolic Panel   1.  Patient had a positive cardiac stress test with mild anterior ischemia with preserved ejection fraction of 67%.  Patient has had persistent chest pain with radiation for approximately 1 year that continues to get worse.  It is exacerbated with activity relieved with rest and nitroglycerin.  She has associated symptoms of shortness of air, dizziness, cough, and diaphoresis.  She also is positive for PND, orthopnea, and decreased exercise tolerance.  She has a strong significant family history of coronary artery disease including her brother who is 51 who is already had 2 myocardial infarctions.  Due to class IV anginal equivalent symptoms I feel it is appropriate to move forth a left heart catheterization.  After discussion with the patient the patient agrees and wishes to proceed.  Benefits and risk of the procedure were explained and patient verbalized understanding.  In addition patient has previously been diagnosed  with sleep apnea, previous stroke, and TIA.  2.  We will order preprocedure labs and start patient on Plavix 75 mg daily.  The patient's left heart catheterization is negative and does not require a stent will be able to discontinue Plavix at that time.  Discussed high intensity statin with patient and opted to wait to see see the results of the heart cath before initiating statin therapy.  3.  On previous labs patient had a creatinine of 1.5 in which I feel it is appropriate to hold the ACE inhibitor at this time as well as a heart rate of 64 at rest so beta-blocker therapy was also held at this time.  4.  Patient will be increased on Imdur 60 mg daily for assistance and helping to relieve  her symptoms.  Patient also has sublingual nitroglycerin for breakthrough pain and advised to take up to 3 doses 5 minutes apart and to seek emergent treatment if pain persist.  5.  Patient is established patient of Dr. Hall's on 1/27/2020.  In which she has been followed for her dyspnea and sleep apnea.  Patient is not been able to tolerate CPAP and is no longer using it.  6.  Patient reports an exacerbated level of agitation and depression states that she is always angry.  Patient is already on Lexapro 10 mg daily I will increase this to 20 mg daily and advised patient to follow-up with Dr. Solano for further management.  7.  Patient reports coughing episodes which lead to multiple symptoms.  Patient denies any fever or chills will prescribe Tessalon Perles and effort to help reduce cough.  Also asked to discuss with Dr. Hall for further evaluation from the pulmonary perspective.  8.  Informed of signs and symptoms of ACS and advised to seek emergent treatment for any new worsening symptoms.  Patient also advised sooner follow-up as needed.    Return if symptoms worsen or fail to improve, for 1-2 weeks after Heart Cath.    Dara was seen today for chest pain and fatigue.    Diagnoses and all orders for this  visit:    Positive cardiac stress test  -     [facility]; Future  -     clopidogrel (PLAVIX) 75 MG tablet; Take 1 tablet by mouth Daily.    Precordial pain  -     isosorbide mononitrate (IMDUR) 60 MG 24 hr tablet; Take 1 tablet by mouth Daily.  -     Discontinue: lisinopril (PRINIVIL,ZESTRIL) 2.5 MG tablet; Take 1 tablet by mouth Daily.  -     CBC & Differential; Future  -     Basic Metabolic Panel; Future  -     [facility]; Future  -     clopidogrel (PLAVIX) 75 MG tablet; Take 1 tablet by mouth Daily.    Grade I diastolic dysfunction  -     Discontinue: lisinopril (PRINIVIL,ZESTRIL) 2.5 MG tablet; Take 1 tablet by mouth Daily.  -     CBC & Differential; Future  -     Basic Metabolic Panel; Future  -     [facility]; Future  -     clopidogrel (PLAVIX) 75 MG tablet; Take 1 tablet by mouth Daily.    Agitation  -     escitalopram (LEXAPRO) 20 MG tablet; Take 1 tablet by mouth Daily.  -     CBC & Differential; Future  -     Basic Metabolic Panel; Future    Depression determined by examination  -     escitalopram (LEXAPRO) 20 MG tablet; Take 1 tablet by mouth Daily.  -     CBC & Differential; Future  -     Basic Metabolic Panel; Future    Cough  -     benzonatate (TESSALON PERLES) 100 MG capsule; Take 1 capsule by mouth 3 (Three) Times a Day As Needed for Cough.  -     CBC & Differential; Future  -     Basic Metabolic Panel; Future        Dara Lunsford  reports that she has never smoked. She has never used smokeless tobacco.    Patient's Body mass index is 45.26 kg/m². BMI is above normal parameters. Recommendations include: educational material.           MEDS ORDERED DURING VISIT:  New Medications Ordered This Visit   Medications   • benzonatate (TESSALON PERLES) 100 MG capsule     Sig: Take 1 capsule by mouth 3 (Three) Times a Day As Needed for Cough.     Dispense:  30 capsule     Refill:  1   • isosorbide mononitrate (IMDUR) 60 MG 24 hr tablet     Sig: Take 1 tablet by mouth Daily.      Dispense:  30 tablet     Refill:  11   • escitalopram (LEXAPRO) 20 MG tablet     Sig: Take 1 tablet by mouth Daily.     Dispense:  30 tablet     Refill:  11   • clopidogrel (PLAVIX) 75 MG tablet     Sig: Take 1 tablet by mouth Daily.     Dispense:  30 tablet     Refill:  11           This document has been electronically signed by Guido Valentino Jr., ELISHA  January 15, 2020 11:13 PM

## 2020-01-15 NOTE — PATIENT INSTRUCTIONS

## 2020-01-29 ENCOUNTER — OUTSIDE FACILITY SERVICE (OUTPATIENT)
Dept: CARDIOLOGY | Facility: CLINIC | Age: 55
End: 2020-01-29

## 2020-01-29 DIAGNOSIS — R07.2 PRECORDIAL PAIN: ICD-10-CM

## 2020-01-29 DIAGNOSIS — I51.89 GRADE I DIASTOLIC DYSFUNCTION: ICD-10-CM

## 2020-01-29 DIAGNOSIS — R94.39 POSITIVE CARDIAC STRESS TEST: ICD-10-CM

## 2020-01-29 PROCEDURE — 93458 L HRT ARTERY/VENTRICLE ANGIO: CPT | Performed by: INTERNAL MEDICINE

## 2021-03-02 DIAGNOSIS — R94.39 POSITIVE CARDIAC STRESS TEST: ICD-10-CM

## 2021-03-02 DIAGNOSIS — R07.2 PRECORDIAL PAIN: ICD-10-CM

## 2021-03-02 DIAGNOSIS — I51.89 GRADE I DIASTOLIC DYSFUNCTION: ICD-10-CM

## 2021-03-02 RX ORDER — ISOSORBIDE MONONITRATE 60 MG/1
60 TABLET, EXTENDED RELEASE ORAL DAILY
Qty: 30 TABLET | Refills: 2 | Status: SHIPPED | OUTPATIENT
Start: 2021-03-02

## 2021-03-02 RX ORDER — CLOPIDOGREL BISULFATE 75 MG/1
75 TABLET ORAL DAILY
Qty: 30 TABLET | Refills: 2 | Status: SHIPPED | OUTPATIENT
Start: 2021-03-02

## 2022-05-17 DIAGNOSIS — R07.2 PRECORDIAL PAIN: ICD-10-CM

## 2022-05-17 RX ORDER — ISOSORBIDE MONONITRATE 60 MG/1
60 TABLET, EXTENDED RELEASE ORAL DAILY
Qty: 30 TABLET | Refills: 2 | OUTPATIENT
Start: 2022-05-17

## 2023-12-12 ENCOUNTER — OFFICE VISIT (OUTPATIENT)
Dept: CARDIOLOGY | Facility: CLINIC | Age: 58
End: 2023-12-12
Payer: MEDICARE

## 2023-12-12 ENCOUNTER — LAB (OUTPATIENT)
Dept: CARDIOLOGY | Facility: CLINIC | Age: 58
End: 2023-12-12
Payer: MEDICARE

## 2023-12-12 VITALS
OXYGEN SATURATION: 99 % | HEART RATE: 76 BPM | SYSTOLIC BLOOD PRESSURE: 139 MMHG | WEIGHT: 275.4 LBS | BODY MASS INDEX: 45.83 KG/M2 | DIASTOLIC BLOOD PRESSURE: 95 MMHG

## 2023-12-12 DIAGNOSIS — R55 NEAR SYNCOPE: ICD-10-CM

## 2023-12-12 DIAGNOSIS — R00.2 PALPITATIONS: ICD-10-CM

## 2023-12-12 DIAGNOSIS — R07.2 PRECORDIAL PAIN: ICD-10-CM

## 2023-12-12 DIAGNOSIS — R05.1 ACUTE COUGH: Primary | ICD-10-CM

## 2023-12-12 DIAGNOSIS — R05.1 ACUTE COUGH: ICD-10-CM

## 2023-12-12 DIAGNOSIS — R06.02 SHORTNESS OF BREATH: ICD-10-CM

## 2023-12-12 DIAGNOSIS — R68.83 CHILLS: ICD-10-CM

## 2023-12-12 DIAGNOSIS — I73.9 CLAUDICATION: ICD-10-CM

## 2023-12-12 LAB
ALBUMIN SERPL-MCNC: 4.4 G/DL (ref 3.5–5.2)
ALBUMIN/GLOB SERPL: 1.3 G/DL
ALP SERPL-CCNC: 82 U/L (ref 39–117)
ALT SERPL W P-5'-P-CCNC: 10 U/L (ref 1–33)
ANION GAP SERPL CALCULATED.3IONS-SCNC: 13.5 MMOL/L (ref 5–15)
AST SERPL-CCNC: 17 U/L (ref 1–32)
BASOPHILS # BLD AUTO: 0.07 10*3/MM3 (ref 0–0.2)
BASOPHILS NFR BLD AUTO: 1.1 % (ref 0–1.5)
BILIRUB SERPL-MCNC: 0.8 MG/DL (ref 0–1.2)
BUN SERPL-MCNC: 16 MG/DL (ref 6–20)
BUN/CREAT SERPL: 11.2 (ref 7–25)
CALCIUM SPEC-SCNC: 9.5 MG/DL (ref 8.6–10.5)
CHLORIDE SERPL-SCNC: 102 MMOL/L (ref 98–107)
CHOLEST SERPL-MCNC: 225 MG/DL (ref 0–200)
CO2 SERPL-SCNC: 26.5 MMOL/L (ref 22–29)
CREAT SERPL-MCNC: 1.43 MG/DL (ref 0.57–1)
CRP SERPL-MCNC: 3.49 MG/DL (ref 0–0.5)
D DIMER PPP FEU-MCNC: 0.84 MCGFEU/ML (ref 0–0.58)
DEPRECATED RDW RBC AUTO: 57.7 FL (ref 37–54)
EGFRCR SERPLBLD CKD-EPI 2021: 42.6 ML/MIN/1.73
EOSINOPHIL # BLD AUTO: 0.44 10*3/MM3 (ref 0–0.4)
EOSINOPHIL NFR BLD AUTO: 6.7 % (ref 0.3–6.2)
ERYTHROCYTE [DISTWIDTH] IN BLOOD BY AUTOMATED COUNT: 17.2 % (ref 12.3–15.4)
ERYTHROCYTE [SEDIMENTATION RATE] IN BLOOD: 35 MM/HR (ref 0–30)
FLUAV RNA RESP QL NAA+PROBE: NOT DETECTED
FLUBV RNA RESP QL NAA+PROBE: NOT DETECTED
GLOBULIN UR ELPH-MCNC: 3.4 GM/DL
GLUCOSE SERPL-MCNC: 93 MG/DL (ref 65–99)
HCT VFR BLD AUTO: 39.2 % (ref 34–46.6)
HDLC SERPL-MCNC: 49 MG/DL (ref 40–60)
HGB BLD-MCNC: 12.3 G/DL (ref 12–15.9)
IMM GRANULOCYTES # BLD AUTO: 0.04 10*3/MM3 (ref 0–0.05)
IMM GRANULOCYTES NFR BLD AUTO: 0.6 % (ref 0–0.5)
LDLC SERPL CALC-MCNC: 158 MG/DL (ref 0–100)
LDLC/HDLC SERPL: 3.18 {RATIO}
LYMPHOCYTES # BLD AUTO: 1.87 10*3/MM3 (ref 0.7–3.1)
LYMPHOCYTES NFR BLD AUTO: 28.6 % (ref 19.6–45.3)
MAGNESIUM SERPL-MCNC: 2.4 MG/DL (ref 1.6–2.6)
MCH RBC QN AUTO: 28.3 PG (ref 26.6–33)
MCHC RBC AUTO-ENTMCNC: 31.4 G/DL (ref 31.5–35.7)
MCV RBC AUTO: 90.3 FL (ref 79–97)
MONOCYTES # BLD AUTO: 0.37 10*3/MM3 (ref 0.1–0.9)
MONOCYTES NFR BLD AUTO: 5.7 % (ref 5–12)
NEUTROPHILS NFR BLD AUTO: 3.74 10*3/MM3 (ref 1.7–7)
NEUTROPHILS NFR BLD AUTO: 57.3 % (ref 42.7–76)
NRBC BLD AUTO-RTO: 0 /100 WBC (ref 0–0.2)
NT-PROBNP SERPL-MCNC: 221.9 PG/ML (ref 0–900)
PLATELET # BLD AUTO: 190 10*3/MM3 (ref 140–450)
PMV BLD AUTO: 9.9 FL (ref 6–12)
POTASSIUM SERPL-SCNC: 3.7 MMOL/L (ref 3.5–5.2)
PROT SERPL-MCNC: 7.8 G/DL (ref 6–8.5)
RBC # BLD AUTO: 4.34 10*6/MM3 (ref 3.77–5.28)
SARS-COV-2 RNA RESP QL NAA+PROBE: NOT DETECTED
SODIUM SERPL-SCNC: 142 MMOL/L (ref 136–145)
TRIGL SERPL-MCNC: 101 MG/DL (ref 0–150)
TSH SERPL DL<=0.05 MIU/L-ACNC: 64.43 UIU/ML (ref 0.27–4.2)
VLDLC SERPL-MCNC: 18 MG/DL (ref 5–40)
WBC NRBC COR # BLD AUTO: 6.53 10*3/MM3 (ref 3.4–10.8)

## 2023-12-12 PROCEDURE — 1159F MED LIST DOCD IN RCRD: CPT | Performed by: NURSE PRACTITIONER

## 2023-12-12 PROCEDURE — 85025 COMPLETE CBC W/AUTO DIFF WBC: CPT | Performed by: NURSE PRACTITIONER

## 2023-12-12 PROCEDURE — 80053 COMPREHEN METABOLIC PANEL: CPT | Performed by: NURSE PRACTITIONER

## 2023-12-12 PROCEDURE — 83880 ASSAY OF NATRIURETIC PEPTIDE: CPT | Performed by: NURSE PRACTITIONER

## 2023-12-12 PROCEDURE — 85652 RBC SED RATE AUTOMATED: CPT | Performed by: NURSE PRACTITIONER

## 2023-12-12 PROCEDURE — 80061 LIPID PANEL: CPT | Performed by: NURSE PRACTITIONER

## 2023-12-12 PROCEDURE — 83735 ASSAY OF MAGNESIUM: CPT | Performed by: NURSE PRACTITIONER

## 2023-12-12 PROCEDURE — 84443 ASSAY THYROID STIM HORMONE: CPT | Performed by: NURSE PRACTITIONER

## 2023-12-12 PROCEDURE — 86140 C-REACTIVE PROTEIN: CPT | Performed by: NURSE PRACTITIONER

## 2023-12-12 PROCEDURE — 1160F RVW MEDS BY RX/DR IN RCRD: CPT | Performed by: NURSE PRACTITIONER

## 2023-12-12 PROCEDURE — 99204 OFFICE O/P NEW MOD 45 MIN: CPT | Performed by: NURSE PRACTITIONER

## 2023-12-12 PROCEDURE — 85379 FIBRIN DEGRADATION QUANT: CPT | Performed by: NURSE PRACTITIONER

## 2023-12-12 PROCEDURE — 87636 SARSCOV2 & INF A&B AMP PRB: CPT | Performed by: NURSE PRACTITIONER

## 2023-12-12 PROCEDURE — 93000 ELECTROCARDIOGRAM COMPLETE: CPT | Performed by: NURSE PRACTITIONER

## 2023-12-12 RX ORDER — LEVOTHYROXINE SODIUM 0.1 MG/1
100 TABLET ORAL DAILY
COMMUNITY
Start: 2023-10-25

## 2023-12-12 RX ORDER — NITROGLYCERIN 0.4 MG/1
TABLET SUBLINGUAL
Qty: 30 TABLET | Refills: 5 | Status: SHIPPED | OUTPATIENT
Start: 2023-12-12

## 2023-12-12 RX ORDER — ALBUTEROL SULFATE 90 UG/1
2 AEROSOL, METERED RESPIRATORY (INHALATION) EVERY 4 HOURS PRN
COMMUNITY

## 2023-12-12 RX ORDER — IPRATROPIUM BROMIDE AND ALBUTEROL SULFATE 2.5; .5 MG/3ML; MG/3ML
3 SOLUTION RESPIRATORY (INHALATION)
COMMUNITY

## 2023-12-12 RX ORDER — ISOSORBIDE MONONITRATE 60 MG/1
60 TABLET, EXTENDED RELEASE ORAL DAILY
Qty: 30 TABLET | Refills: 6 | Status: SHIPPED | OUTPATIENT
Start: 2023-12-12

## 2023-12-12 NOTE — PROGRESS NOTES
Olena Lunsford is a 58 y.o. female who presents to [facility] for re establish  care (Hx of Stroke in the past ).    CHIEF COMPLIANT  Chief Complaint   Patient presents with    re establish  care     Hx of Stroke in the past        Active Problems:  Problem List Items Addressed This Visit    None  Visit Diagnoses       Acute cough    -  Primary    Relevant Orders    Stress Test With Myocardial Perfusion One Day    Adult Transthoracic Echo Complete W/ Cont if Necessary Per Protocol    XR Chest PA & Lateral    CBC & Differential    Comprehensive Metabolic Panel    TSH    Lipid Panel    Magnesium    C-reactive Protein    Sedimentation Rate    Mobile Cardiac Outpatient Telemetry    proBNP    D-dimer, Quantitative    Doppler Ankle Brachial Index Single Level CAR    COVID PRE-OP / PRE-PROCEDURE SCREENING ORDER (NO ISOLATION) - Swab, Nasal Cavity    Precordial pain        Relevant Medications    isosorbide mononitrate (IMDUR) 60 MG 24 hr tablet    nitroglycerin (NITROSTAT) 0.4 MG SL tablet    Other Relevant Orders    Stress Test With Myocardial Perfusion One Day    Adult Transthoracic Echo Complete W/ Cont if Necessary Per Protocol    XR Chest PA & Lateral    CBC & Differential    Comprehensive Metabolic Panel    TSH    Lipid Panel    Magnesium    C-reactive Protein    Sedimentation Rate    Mobile Cardiac Outpatient Telemetry    proBNP    D-dimer, Quantitative    Doppler Ankle Brachial Index Single Level CAR    COVID PRE-OP / PRE-PROCEDURE SCREENING ORDER (NO ISOLATION) - Swab, Nasal Cavity    Shortness of breath        Relevant Orders    Stress Test With Myocardial Perfusion One Day    Adult Transthoracic Echo Complete W/ Cont if Necessary Per Protocol    XR Chest PA & Lateral    CBC & Differential    Comprehensive Metabolic Panel    TSH    Lipid Panel    Magnesium    C-reactive Protein    Sedimentation Rate    Mobile Cardiac Outpatient Telemetry    proBNP    D-dimer, Quantitative    Doppler Ankle Brachial Index  Single Level CAR    COVID PRE-OP / PRE-PROCEDURE SCREENING ORDER (NO ISOLATION) - Swab, Nasal Cavity    Chills        Relevant Orders    Stress Test With Myocardial Perfusion One Day    Adult Transthoracic Echo Complete W/ Cont if Necessary Per Protocol    XR Chest PA & Lateral    CBC & Differential    Comprehensive Metabolic Panel    TSH    Lipid Panel    Magnesium    C-reactive Protein    Sedimentation Rate    Mobile Cardiac Outpatient Telemetry    proBNP    D-dimer, Quantitative    Doppler Ankle Brachial Index Single Level CAR    COVID PRE-OP / PRE-PROCEDURE SCREENING ORDER (NO ISOLATION) - Swab, Nasal Cavity    Palpitations        Relevant Orders    Stress Test With Myocardial Perfusion One Day    Adult Transthoracic Echo Complete W/ Cont if Necessary Per Protocol    XR Chest PA & Lateral    CBC & Differential    Comprehensive Metabolic Panel    TSH    Lipid Panel    Magnesium    C-reactive Protein    Sedimentation Rate    Mobile Cardiac Outpatient Telemetry    proBNP    D-dimer, Quantitative    Doppler Ankle Brachial Index Single Level CAR    COVID PRE-OP / PRE-PROCEDURE SCREENING ORDER (NO ISOLATION) - Swab, Nasal Cavity    Near syncope        Relevant Orders    Stress Test With Myocardial Perfusion One Day    Adult Transthoracic Echo Complete W/ Cont if Necessary Per Protocol    XR Chest PA & Lateral    CBC & Differential    Comprehensive Metabolic Panel    TSH    Lipid Panel    Magnesium    C-reactive Protein    Sedimentation Rate    Mobile Cardiac Outpatient Telemetry    proBNP    D-dimer, Quantitative    Doppler Ankle Brachial Index Single Level CAR    COVID PRE-OP / PRE-PROCEDURE SCREENING ORDER (NO ISOLATION) - Swab, Nasal Cavity    Claudication        Relevant Orders    Doppler Ankle Brachial Index Single Level CAR    COVID PRE-OP / PRE-PROCEDURE SCREENING ORDER (NO ISOLATION) - Swab, Nasal Cavity            HPI  HPI  Ms. Dara Lunsford is a 58-year-old female patient who is being followed up today for  multiple symptoms.  She was exposed to COVID by her grandchild who was diagnosed with flu and COVID.  She too is having symptoms of COVID such as coughing chilling near syncope productive cough with yellow sputum.  This was not knowledge on arrival to our office.  She does report that she took 2 COVID test at home which were negative.    Patient reports that she is having chills alternating with burning up, productive cough with green to yellow sputum.  Patient brought a bottle of her sputum with her today.  She also reports significant level of dyspnea.  She says but she becomes dyspneic with any activity.  She also says she is dyspneic at rest.  She says that she does feel smothering and she cannot get a good breath.  She has a productive cough of yellow to green sputum.  She says that point she coughs so much that she feels as if she may pass out and things will go black.  She also has periods where she is gasping for air.  She also reports that she has severe left rib pain with coughing that radiates into her back.  She is seeing Dr. Adame for pulmonology and says that he does not believe she has COPD that is more of asthma or bronchitis.  She is on oxygen all the time.  She is not on oxygen in the office today.    Patient also reports intermittent chest pain that mainly occurs with activity.  She says if she does any activity at all it feels as if she something is sitting on her chest.  She says even washing dishes and normal chores cause her to chest tightness.  She also has associated weakness fatigue and shortness of breath.  Says that the pain usually resolves with rest.  She says she also has radiation into the left side of her neck at times which feels like a muscle spasm.  She reports that she had a severe episode of left-sided chest pain on Friday night.  Patient also reports that when she walks around she feels like there is a fluid swishing in her chest.    She also reported at 1 point she had a knot  in her left chest posterior axillary area and now it has moved underneath her left axillary and has become 2 separate knots.    Patient also reports palpitations when she has intermittent fluttering or racing type sensation that occurs in her chest.  PRIOR MEDS  Current Outpatient Medications on File Prior to Visit   Medication Sig Dispense Refill    albuterol sulfate  (90 Base) MCG/ACT inhaler Inhale 2 puffs Every 4 (Four) Hours As Needed for Wheezing or Shortness of Air.      ipratropium-albuterol (DUO-NEB) 0.5-2.5 mg/3 ml nebulizer Take 3 mL by nebulization 4 (Four) Times a Day.      levothyroxine (SYNTHROID, LEVOTHROID) 100 MCG tablet Take 1 tablet by mouth Daily.      [DISCONTINUED] nitroglycerin (NITROSTAT) 0.4 MG SL tablet 1 under the tongue as needed for angina, may repeat q5mins for up three doses 30 tablet 5    [DISCONTINUED] benzonatate (TESSALON PERLES) 100 MG capsule Take 1 capsule by mouth 3 (Three) Times a Day As Needed for Cough. 30 capsule 1    [DISCONTINUED] clopidogrel (PLAVIX) 75 MG tablet TAKE 1 TABLET BY MOUTH DAILY 30 tablet 2    [DISCONTINUED] escitalopram (LEXAPRO) 20 MG tablet Take 1 tablet by mouth Daily. 30 tablet 11    [DISCONTINUED] furosemide (LASIX) 40 MG tablet TAKE ONE TABLET PO PRN  5    [DISCONTINUED] hydrOXYzine (ATARAX) 10 MG tablet take one tablet daily  2    [DISCONTINUED] isosorbide mononitrate (IMDUR) 60 MG 24 hr tablet TAKE 1 TABLET BY MOUTH DAILY 30 tablet 2    [DISCONTINUED] levothyroxine (SYNTHROID, LEVOTHROID) 175 MCG tablet Take 1 tablet by mouth Daily.  5     No current facility-administered medications on file prior to visit.       ALLERGIES  Penicillins and Latex    HISTORY  Past Medical History:   Diagnosis Date    Anxiety     Hypothyroidism     Stroke (cerebrum) 2016    TIA (transient ischemic attack)        Social History     Socioeconomic History    Marital status:    Tobacco Use    Smoking status: Never    Smokeless tobacco: Never   Substance and  Sexual Activity    Alcohol use: No    Drug use: No    Sexual activity: Defer       Family History   Problem Relation Age of Onset    Heart disease Mother     Heart disease Father     Heart disease Sister     Heart disease Brother        Review of Systems   Constitutional:  Positive for chills. Negative for fatigue and fever.   HENT:  Positive for congestion (coughing up yellow). Negative for rhinorrhea and sore throat.    Eyes:  Negative for visual disturbance.   Respiratory:  Positive for apnea, cough (coughing up yellow), chest tightness (feels like something is sitting on her chest) and shortness of breath (feels like she is smothering/ she can only walk short distance and has to sit down).    Cardiovascular:  Positive for chest pain (hurts in chest and back), palpitations (flutters in chest and races) and leg swelling (from the knee down).   Gastrointestinal:  Positive for nausea. Negative for constipation and diarrhea.   Musculoskeletal:  Positive for arthralgias, back pain and neck pain (pain in left side of neck feels like a muscle spasm pulls).   Allergic/Immunologic: Positive for environmental allergies. Negative for food allergies.   Neurological:  Positive for dizziness (bending over), syncope (has passed out a couple of times recently), weakness and light-headedness. Negative for headaches.   Hematological:  Bruises/bleeds easily (bruises).   Psychiatric/Behavioral:  Positive for sleep disturbance (She has to sleep on the couch).        Objective     VITALS: /95 (BP Location: Left arm, Patient Position: Sitting, Cuff Size: Large Adult)   Pulse 76   Wt 125 kg (275 lb 6.4 oz)   SpO2 99%   BMI 45.83 kg/m²     LABS:   Lab Results (most recent)       None            IMAGING:   No Images in the past 120 days found..    EXAM:  Physical Exam  Vitals and nursing note reviewed.   Constitutional:       Appearance: She is well-developed.   HENT:      Head: Normocephalic.   Eyes:      General: Scleral  icterus present.   Neck:      Thyroid: No thyroid mass.      Vascular: No carotid bruit or JVD.      Trachea: Trachea and phonation normal.   Cardiovascular:      Rate and Rhythm: Normal rate and regular rhythm.      Pulses:           Radial pulses are 2+ on the right side and 2+ on the left side.        Posterior tibial pulses are 2+ on the right side and 2+ on the left side.      Heart sounds: Normal heart sounds. No murmur heard.     No friction rub. No gallop.   Pulmonary:      Effort: Pulmonary effort is normal. No respiratory distress.      Breath sounds: Decreased breath sounds present. No wheezing or rales.   Musculoskeletal:         General: Swelling present. Normal range of motion.      Cervical back: Neck supple.   Skin:     General: Skin is warm and dry.      Capillary Refill: Capillary refill takes less than 2 seconds.      Findings: No rash.   Neurological:      Mental Status: She is alert and oriented to person, place, and time.   Psychiatric:         Speech: Speech normal.         Behavior: Behavior normal.         Thought Content: Thought content normal.         Judgment: Judgment normal.         Procedure     ECG 12 Lead    Date/Time: 12/12/2023 2:51 PM  Performed by: Guido Valentino APRN    Authorized by: Guido Valentino APRN  Comparison: compared with previous ECG from 9/25/2023  Rhythm: sinus rhythm  Rate: normal  BPM: 70  QRS axis: left  Other findings: non-specific ST-T wave changes  Comments: QTc 468 ms  No acute changes             Assessment & Plan    Diagnosis Plan   1. Acute cough  Stress Test With Myocardial Perfusion One Day    Adult Transthoracic Echo Complete W/ Cont if Necessary Per Protocol    XR Chest PA & Lateral    CBC & Differential    Comprehensive Metabolic Panel    TSH    Lipid Panel    Magnesium    C-reactive Protein    Sedimentation Rate    Mobile Cardiac Outpatient Telemetry    proBNP    D-dimer, Quantitative    Doppler Ankle Brachial Index Single Level CAR    COVID  PRE-OP / PRE-PROCEDURE SCREENING ORDER (NO ISOLATION) - Swab, Nasal Cavity      2. Precordial pain  Stress Test With Myocardial Perfusion One Day    Adult Transthoracic Echo Complete W/ Cont if Necessary Per Protocol    XR Chest PA & Lateral    CBC & Differential    Comprehensive Metabolic Panel    TSH    Lipid Panel    Magnesium    C-reactive Protein    Sedimentation Rate    Mobile Cardiac Outpatient Telemetry    proBNP    D-dimer, Quantitative    isosorbide mononitrate (IMDUR) 60 MG 24 hr tablet    nitroglycerin (NITROSTAT) 0.4 MG SL tablet    Doppler Ankle Brachial Index Single Level CAR    COVID PRE-OP / PRE-PROCEDURE SCREENING ORDER (NO ISOLATION) - Swab, Nasal Cavity      3. Shortness of breath  Stress Test With Myocardial Perfusion One Day    Adult Transthoracic Echo Complete W/ Cont if Necessary Per Protocol    XR Chest PA & Lateral    CBC & Differential    Comprehensive Metabolic Panel    TSH    Lipid Panel    Magnesium    C-reactive Protein    Sedimentation Rate    Mobile Cardiac Outpatient Telemetry    proBNP    D-dimer, Quantitative    Doppler Ankle Brachial Index Single Level CAR    COVID PRE-OP / PRE-PROCEDURE SCREENING ORDER (NO ISOLATION) - Swab, Nasal Cavity      4. Chills  Stress Test With Myocardial Perfusion One Day    Adult Transthoracic Echo Complete W/ Cont if Necessary Per Protocol    XR Chest PA & Lateral    CBC & Differential    Comprehensive Metabolic Panel    TSH    Lipid Panel    Magnesium    C-reactive Protein    Sedimentation Rate    Mobile Cardiac Outpatient Telemetry    proBNP    D-dimer, Quantitative    Doppler Ankle Brachial Index Single Level CAR    COVID PRE-OP / PRE-PROCEDURE SCREENING ORDER (NO ISOLATION) - Swab, Nasal Cavity      5. Palpitations  Stress Test With Myocardial Perfusion One Day    Adult Transthoracic Echo Complete W/ Cont if Necessary Per Protocol    XR Chest PA & Lateral    CBC & Differential    Comprehensive Metabolic Panel    TSH    Lipid Panel    Magnesium     C-reactive Protein    Sedimentation Rate    Mobile Cardiac Outpatient Telemetry    proBNP    D-dimer, Quantitative    Doppler Ankle Brachial Index Single Level CAR    COVID PRE-OP / PRE-PROCEDURE SCREENING ORDER (NO ISOLATION) - Swab, Nasal Cavity      6. Near syncope  Stress Test With Myocardial Perfusion One Day    Adult Transthoracic Echo Complete W/ Cont if Necessary Per Protocol    XR Chest PA & Lateral    CBC & Differential    Comprehensive Metabolic Panel    TSH    Lipid Panel    Magnesium    C-reactive Protein    Sedimentation Rate    Mobile Cardiac Outpatient Telemetry    proBNP    D-dimer, Quantitative    Doppler Ankle Brachial Index Single Level CAR    COVID PRE-OP / PRE-PROCEDURE SCREENING ORDER (NO ISOLATION) - Swab, Nasal Cavity      7. Claudication  Doppler Ankle Brachial Index Single Level CAR    COVID PRE-OP / PRE-PROCEDURE SCREENING ORDER (NO ISOLATION) - Swab, Nasal Cavity      1.  Due to patient's COVID/pneumonialike symptoms I would like to do an extensive laboratory workup including checking her for COVID and flu.  I would also like to evaluate for other potential causes of her symptoms with a CBC CMP lipid panel magnesium C-reactive protein sed rate proBNP and D-dimer.  2.  Due to patient's palpitations in which she has intermittent fluttering and racing in her chest I would like for her to wear a 14-day cardiac event monitor to help determine the etiology of her palpitations as well as potential causes of her near syncope as well.  3.  Due to patient's chest pain shortness of breath that was there prior to her exposure to COVID I like to get a repeat ischemic workup including stress test and echocardiogram.  She previously had 20 to 30% stenosis in her right coronary artery per left heart catheterization in 2020.  4.  Patient does report claudication-like symptoms and which she develops when she walks.  She says it is even hurts to stand at times.  Therefore I like to get ankle-brachial  indices for further evaluation.  5.  Patient does have a substantial history of pneumonia in the past.  Due to diminished breath sounds bilaterally productive cough chills and and alternation from oxycodone and exposure to COVID and flu I would like to get a chest x-ray for further evaluation of pneumonia.  6.  Due to her chest pain that may not be associated with her upper respiratory infection I would like to restart her on the isosorbide when she is previously on.  Hopefully this will also help level out her blood pressure.  7.  Informed of signs and symptoms of ACS and advised to seek emergent treatment for any new worsening symptoms.  Patient also advised sooner follow-up as needed.  Also advised to follow-up with family doctor as needed  This note is dictated utilizing voice recognition software.  Although this record has been proof read, transcriptional errors may still be present. If questions occur regarding the content of this record please do not hesitate to call our office.  I have reviewed and confirmed the accuracy of the ROS as documented by the MA/LPN/RN ELISHA Warren    Return if symptoms worsen or fail to improve, for Next scheduled follow up.    Diagnoses and all orders for this visit:    1. Acute cough (Primary)  -     Cancel: Covid-19 + Flu A&B AG, Veritor  -     Stress Test With Myocardial Perfusion One Day; Future  -     Adult Transthoracic Echo Complete W/ Cont if Necessary Per Protocol; Future  -     XR Chest PA & Lateral; Future  -     CBC & Differential; Future  -     Comprehensive Metabolic Panel; Future  -     TSH; Future  -     Lipid Panel; Future  -     Magnesium; Future  -     C-reactive Protein; Future  -     Sedimentation Rate; Future  -     Mobile Cardiac Outpatient Telemetry; Future  -     proBNP; Future  -     D-dimer, Quantitative; Future  -     Doppler Ankle Brachial Index Single Level CAR; Future  -     COVID PRE-OP / PRE-PROCEDURE SCREENING ORDER (NO ISOLATION) - Swab,  Nasal Cavity; Future    2. Precordial pain  -     Cancel: Covid-19 + Flu A&B AG, Veritor  -     Stress Test With Myocardial Perfusion One Day; Future  -     Adult Transthoracic Echo Complete W/ Cont if Necessary Per Protocol; Future  -     XR Chest PA & Lateral; Future  -     CBC & Differential; Future  -     Comprehensive Metabolic Panel; Future  -     TSH; Future  -     Lipid Panel; Future  -     Magnesium; Future  -     C-reactive Protein; Future  -     Sedimentation Rate; Future  -     Mobile Cardiac Outpatient Telemetry; Future  -     proBNP; Future  -     D-dimer, Quantitative; Future  -     isosorbide mononitrate (IMDUR) 60 MG 24 hr tablet; Take 1 tablet by mouth Daily.  Dispense: 30 tablet; Refill: 6  -     nitroglycerin (NITROSTAT) 0.4 MG SL tablet; 1 under the tongue as needed for angina, may repeat q5mins for up three doses  Dispense: 30 tablet; Refill: 5  -     Doppler Ankle Brachial Index Single Level CAR; Future  -     COVID PRE-OP / PRE-PROCEDURE SCREENING ORDER (NO ISOLATION) - Swab, Nasal Cavity; Future    3. Shortness of breath  -     Cancel: Covid-19 + Flu A&B AG, Veritor  -     Stress Test With Myocardial Perfusion One Day; Future  -     Adult Transthoracic Echo Complete W/ Cont if Necessary Per Protocol; Future  -     XR Chest PA & Lateral; Future  -     CBC & Differential; Future  -     Comprehensive Metabolic Panel; Future  -     TSH; Future  -     Lipid Panel; Future  -     Magnesium; Future  -     C-reactive Protein; Future  -     Sedimentation Rate; Future  -     Mobile Cardiac Outpatient Telemetry; Future  -     proBNP; Future  -     D-dimer, Quantitative; Future  -     Doppler Ankle Brachial Index Single Level CAR; Future  -     COVID PRE-OP / PRE-PROCEDURE SCREENING ORDER (NO ISOLATION) - Swab, Nasal Cavity; Future    4. Chills  -     Cancel: Covid-19 + Flu A&B AG, Veritor  -     Stress Test With Myocardial Perfusion One Day; Future  -     Adult Transthoracic Echo Complete W/ Cont if  Necessary Per Protocol; Future  -     XR Chest PA & Lateral; Future  -     CBC & Differential; Future  -     Comprehensive Metabolic Panel; Future  -     TSH; Future  -     Lipid Panel; Future  -     Magnesium; Future  -     C-reactive Protein; Future  -     Sedimentation Rate; Future  -     Mobile Cardiac Outpatient Telemetry; Future  -     proBNP; Future  -     D-dimer, Quantitative; Future  -     Doppler Ankle Brachial Index Single Level CAR; Future  -     COVID PRE-OP / PRE-PROCEDURE SCREENING ORDER (NO ISOLATION) - Swab, Nasal Cavity; Future    5. Palpitations  -     Cancel: Covid-19 + Flu A&B AG, Veritor  -     Stress Test With Myocardial Perfusion One Day; Future  -     Adult Transthoracic Echo Complete W/ Cont if Necessary Per Protocol; Future  -     XR Chest PA & Lateral; Future  -     CBC & Differential; Future  -     Comprehensive Metabolic Panel; Future  -     TSH; Future  -     Lipid Panel; Future  -     Magnesium; Future  -     C-reactive Protein; Future  -     Sedimentation Rate; Future  -     Mobile Cardiac Outpatient Telemetry; Future  -     proBNP; Future  -     D-dimer, Quantitative; Future  -     Doppler Ankle Brachial Index Single Level CAR; Future  -     COVID PRE-OP / PRE-PROCEDURE SCREENING ORDER (NO ISOLATION) - Swab, Nasal Cavity; Future    6. Near syncope  -     Cancel: Covid-19 + Flu A&B AG, Veritor  -     Stress Test With Myocardial Perfusion One Day; Future  -     Adult Transthoracic Echo Complete W/ Cont if Necessary Per Protocol; Future  -     XR Chest PA & Lateral; Future  -     CBC & Differential; Future  -     Comprehensive Metabolic Panel; Future  -     TSH; Future  -     Lipid Panel; Future  -     Magnesium; Future  -     C-reactive Protein; Future  -     Sedimentation Rate; Future  -     Mobile Cardiac Outpatient Telemetry; Future  -     proBNP; Future  -     D-dimer, Quantitative; Future  -     Doppler Ankle Brachial Index Single Level CAR; Future  -     COVID PRE-OP /  PRE-PROCEDURE SCREENING ORDER (NO ISOLATION) - Swab, Nasal Cavity; Future    7. Claudication  -     Doppler Ankle Brachial Index Single Level CAR; Future  -     COVID PRE-OP / PRE-PROCEDURE SCREENING ORDER (NO ISOLATION) - Swab, Nasal Cavity; Future    Other orders  -     ECG 12 Lead        Dara Lunsford  reports that she has never smoked. She has never used smokeless tobacco.. I have educated her on the risk of diseases from using tobacco products .           MEDS ORDERED DURING VISIT:  New Medications Ordered This Visit   Medications    isosorbide mononitrate (IMDUR) 60 MG 24 hr tablet     Sig: Take 1 tablet by mouth Daily.     Dispense:  30 tablet     Refill:  6    nitroglycerin (NITROSTAT) 0.4 MG SL tablet     Si under the tongue as needed for angina, may repeat q5mins for up three doses     Dispense:  30 tablet     Refill:  5           This document has been electronically signed by Guido Valentino Jr., APRN  2023 18:02 EST

## 2023-12-13 ENCOUNTER — TELEPHONE (OUTPATIENT)
Dept: CARDIOLOGY | Facility: CLINIC | Age: 58
End: 2023-12-13
Payer: MEDICARE

## 2023-12-13 DIAGNOSIS — R06.02 SHORTNESS OF BREATH: ICD-10-CM

## 2023-12-13 DIAGNOSIS — R05.1 ACUTE COUGH: Primary | ICD-10-CM

## 2023-12-13 DIAGNOSIS — R79.89 POSITIVE D DIMER: ICD-10-CM

## 2023-12-13 DIAGNOSIS — R55 NEAR SYNCOPE: ICD-10-CM

## 2023-12-13 DIAGNOSIS — R00.2 PALPITATIONS: ICD-10-CM

## 2023-12-13 NOTE — PROGRESS NOTES
Patient's TSH is significantly elevated at 64.43.  She will need to follow-up with her PCP.  Please forward these labs to her PCP.    Her renal function is also decreased at 1.43 with a GFR of 42.6.  This puts her at stage III chronic kidney disease.    Patient also has significant hyperlipidemia with an LDL of 158.  I would like for her to start rosuvastatin 20 mg daily.  Have her cholesterol rechecked in 3 months.    Patient CRP is elevated at 3.49 and her sed rate was elevated at 35.    Normal proBNP and magnesium.  COVID and flu are both negative.    Patient did have a positive D-dimer of 0.84 in which I like for her to have a CTA of her chest to rule out PE as a potential cause of her symptoms.

## 2023-12-13 NOTE — TELEPHONE ENCOUNTER
I called and left a detailed message on voicemail of all of patients lab results.    Patient's TSH is significantly elevated at 64.43.  She will need to follow-up with her PCP.  Please forward these labs to her PCP.     Her renal function is also decreased at 1.43 with a GFR of 42.6.  This puts her at stage III chronic kidney disease.     Patient also has significant hyperlipidemia with an LDL of 158.  I would like for her to start rosuvastatin 20 mg daily.  Have her cholesterol rechecked in 3 months.     Patient CRP is elevated at 3.49 and her sed rate was elevated at 35.     Normal proBNP and magnesium.  COVID and flu are both negative.     Patient did have a positive D-dimer of 0.84 in which I like for her to have a CTA of her chest to rule out PE as a potential cause of her symptoms.

## 2023-12-13 NOTE — TELEPHONE ENCOUNTER
----- Message from ELISHA Warren sent at 12/13/2023 12:38 PM EST -----  No acute cardiopulmonary pathology.  No pneumonias effusions or consolidations.

## 2023-12-13 NOTE — TELEPHONE ENCOUNTER
----- Message from ELISHA Warren sent at 12/13/2023  8:57 AM EST -----  Patient's TSH is significantly elevated at 64.43.  She will need to follow-up with her PCP.  Please forward these labs to her PCP.    Her renal function is also decreased at 1.43 with a GFR of 42.6.  This puts her at stage III chronic kidney disease.    Patient also has significant hyperlipidemia with an LDL of 158.  I would like for her to start rosuvastatin 20 mg daily.  Have her cholesterol rechecked in 3 months.    Patient CRP is elevated at 3.49 and her sed rate was elevated at 35.    Normal proBNP and magnesium.  COVID and flu are both negative.    Patient did have a positive D-dimer of 0.84 in which I like for her to have a CTA of her chest to rule out PE as a potential cause of her symptoms.

## 2023-12-21 ENCOUNTER — TELEPHONE (OUTPATIENT)
Dept: CARDIOLOGY | Facility: CLINIC | Age: 58
End: 2023-12-21
Payer: MEDICARE

## 2023-12-21 NOTE — TELEPHONE ENCOUNTER
Caller: Dara Lunsford    Relationship: Self    Best call back number: [phone]     Caller requesting test results: SELF     What test was performed: TOOK PICTURES OF HER LUNGS     When was the test performed: 12/18 OR 12/19 OR WEEK BEFORE.     Where was the test performed: [facility]     Additional notes: PT STATES SHE GOT A LETTER FROM [facility] STATING THAT THEY MAYBE FOUND SOMETHING ON HER LUNGS AND AND TOLD HER TO REACH OUT TO THE ORDERING PROVIDER. HAS TESTING ON HER LEGS 12/22/23.

## 2023-12-26 ENCOUNTER — TELEPHONE (OUTPATIENT)
Dept: CARDIOLOGY | Facility: CLINIC | Age: 58
End: 2023-12-26
Payer: MEDICARE

## 2023-12-26 NOTE — TELEPHONE ENCOUNTER
Notified pt of her testing results and ELISHA Radford's recommendations.  Pt states that she had mentioned before and the muscle spasms in left side of her neck to the side of head for a long time and the lady that did her testing her legs told her to see if she can get some testing on her neck to check for blockages.     ----- Message from ELISHA Warren sent at 12/22/2023  2:23 PM EST -----  Patient's VQ scan was low probability of pulmonary embolism.  However she did have a matched ventilation/perfusion defect in the bilateral upper lobes.  With no pneumonias or acute findings on the chest x-ray I would recommend following up with pulmo  nology

## 2024-01-04 ENCOUNTER — TELEPHONE (OUTPATIENT)
Dept: CARDIOLOGY | Facility: CLINIC | Age: 59
End: 2024-01-04
Payer: MEDICARE

## 2024-01-04 NOTE — TELEPHONE ENCOUNTER
FRANCOIS  Pt notified of no acute findings. Provider will discuss results at f/u. Pt reminded of appt date and time.  ----- Message from Trixie Baumann MA sent at 1/4/2024  4:23 PM EST -----    ----- Message -----  From: Guido Valentino APRN  Sent: 1/3/2024   1:24 PM EST  To: Trixie Baumann MA    Normal bilateral ABIs

## 2024-01-05 DIAGNOSIS — R55 NEAR SYNCOPE: Primary | ICD-10-CM

## 2024-01-05 DIAGNOSIS — R42 DIZZY: ICD-10-CM

## 2024-01-08 ENCOUNTER — TELEPHONE (OUTPATIENT)
Dept: CARDIOLOGY | Facility: CLINIC | Age: 59
End: 2024-01-08
Payer: MEDICARE

## 2024-01-08 DIAGNOSIS — R05.8 PRODUCTIVE COUGH: ICD-10-CM

## 2024-01-08 DIAGNOSIS — R06.02 SHORTNESS OF BREATH: Primary | ICD-10-CM

## 2024-01-08 DIAGNOSIS — R55 NEAR SYNCOPE: ICD-10-CM

## 2024-01-15 ENCOUNTER — TELEPHONE (OUTPATIENT)
Dept: CARDIOLOGY | Facility: CLINIC | Age: 59
End: 2024-01-15
Payer: MEDICARE

## 2024-01-15 NOTE — TELEPHONE ENCOUNTER
I called patient and notified that ventilation lung scan did not show PE. She has upcoming apt on Feb 15.

## 2024-01-23 ENCOUNTER — TELEPHONE (OUTPATIENT)
Dept: CARDIOLOGY | Facility: CLINIC | Age: 59
End: 2024-01-23

## 2024-01-23 NOTE — TELEPHONE ENCOUNTER
Caller: Dara Lunsford    Relationship: Self    Best call back number: [phone]     What is the best time to reach you: ANYTIME AFTER 12:00PM     What was the call regarding: PT CHANGED HER INSURANCE FROM [facility] HMO TO [facility] PPO, TIRED TO ADD THIS INSURANCE TWICE AND IT IT KEEPS COMING BACK HMO. PT CHECKED HER CARD AND WITH INSURANCE, THIS IS PPO. PLEASE ADVISE ON WHAT TO DO.     Is it okay if the provider responds through [employer] CALL

## 2024-01-25 ENCOUNTER — TELEPHONE (OUTPATIENT)
Dept: CARDIOLOGY | Facility: CLINIC | Age: 59
End: 2024-01-25
Payer: MEDICARE

## 2024-01-25 NOTE — TELEPHONE ENCOUNTER
Hub to read: Relay    Tried to call pt to see if she has sent her Zio monitor back yet. If so when and how did she send it back.

## 2024-01-30 ENCOUNTER — TELEPHONE (OUTPATIENT)
Dept: CARDIOLOGY | Facility: CLINIC | Age: 59
End: 2024-01-30
Payer: MEDICARE

## 2024-01-30 NOTE — TELEPHONE ENCOUNTER
For the HUB to read to pt:     US CAROTID  Called patient to notify of no acute findings or abnormalities. Keep follow up as scheduled. If you have any problem between now and then give our office a call.   ----- Message from Trixie Baumann MA sent at 1/29/2024 12:44 PM EST -----  Regarding: FW:    ----- Message -----  From: Guido Valentino APRN  Sent: 1/28/2024   9:45 AM EST  To: Trixie Baumann MA  Subject: FW:                                              No flow-limiting stenosis in either carotid artery.  Keep follow-up.  ----- Message -----  From: Shirlene Gómez RegSched Rep  Sent: 1/26/2024  10:22 AM EST  To: ELISHA Warren

## 2024-02-15 ENCOUNTER — TELEPHONE (OUTPATIENT)
Dept: CARDIOLOGY | Facility: CLINIC | Age: 59
End: 2024-02-15
Payer: MEDICARE

## 2024-02-19 ENCOUNTER — TELEPHONE (OUTPATIENT)
Dept: CARDIOLOGY | Facility: CLINIC | Age: 59
End: 2024-02-19
Payer: MEDICARE

## 2024-02-19 NOTE — TELEPHONE ENCOUNTER
S/w Pt she stated that she mailed her monitors back 1st part of Feb. She placed inside blue box outside of post office. Called s/w Analilia @ [employer] she is going to contact the shipping dept they seen movement on [id number].  She does not see any movement with appt [id number] she can not see that a mailing label has been printed. She is working on this. Analilia stated at this point the [id number] is considered lost.

## 2024-02-28 ENCOUNTER — TELEPHONE (OUTPATIENT)
Dept: CARDIOLOGY | Facility: CLINIC | Age: 59
End: 2024-02-28
Payer: MEDICARE

## 2024-02-28 NOTE — TELEPHONE ENCOUNTER
Caller: Dara Lunsford    Relationship: Self    Best call back number: [phone]     What is the best time to reach you: ANYTIME     What was the call regarding: PT IS REQUESTING THE RESULTS OF HEART MONITOR, HAS CALLED IN ABOUT THIS BEFORE. PLEASE ADVISE ASAP IF AVAILABLE     Is it okay if the provider responds through MyChart: CALL

## 2024-02-28 NOTE — TELEPHONE ENCOUNTER
S/w Pt she stated that she mailed her monitors back 1st part of Feb. She placed inside blue box outside of post office. Called s/w Analilia @ [employer] she is going to contact the shipping dept they seen movement on [id number].  She does not see any movement with appt [id number] she can not see that a mailing label has been printed. She is working on this. Analilia stated at this point the [id number] is considered lost.    This was the information per Mariely   On 2/819/23    I will call patient in the morning and explain this to her.

## 2024-02-29 NOTE — TELEPHONE ENCOUNTER
I called patient and left a message that we will not have a end of service report due to monitor being lost. I let her know that the monitoring company said that they would not charge her for the monitor as well. I asked her to call with any further questions.

## 2024-03-22 ENCOUNTER — TELEPHONE (OUTPATIENT)
Dept: CARDIOLOGY | Facility: CLINIC | Age: 59
End: 2024-03-22
Payer: MEDICARE

## 2024-03-25 NOTE — TELEPHONE ENCOUNTER
I called patient back her phone has been messed up she is not getting our messages . I advised that monitors are not showing any movement and have been marked as lost. There are no results to go over unfortunately. Patient wanted to know if she could wear another monitor. I will check to see if that is possible..

## 2024-03-28 ENCOUNTER — TELEPHONE (OUTPATIENT)
Dept: CARDIOLOGY | Facility: CLINIC | Age: 59
End: 2024-03-28
Payer: MEDICARE

## 2024-03-28 NOTE — TELEPHONE ENCOUNTER
RELAY    LVM for pt to return the call to see if patient wants the monitor mailed out or if she is coming by the office to .

## 2024-04-08 ENCOUNTER — TELEPHONE (OUTPATIENT)
Dept: CARDIOLOGY | Facility: CLINIC | Age: 59
End: 2024-04-08
Payer: MEDICARE

## 2024-04-08 NOTE — TELEPHONE ENCOUNTER
Lvm for pt to return call to see if she got a different insurance than [facility] HMO. Also to see if pt is still wanting to wear another monitor? If she does, need to know if pt is coming by office to  or if she wants it mailed to her?

## 2024-04-11 ENCOUNTER — TELEPHONE (OUTPATIENT)
Dept: CARDIOLOGY | Facility: CLINIC | Age: 59
End: 2024-04-11

## 2024-04-11 ENCOUNTER — TELEPHONE (OUTPATIENT)
Dept: CARDIOLOGY | Facility: CLINIC | Age: 59
End: 2024-04-11
Payer: MEDICARE

## 2024-04-11 RX ORDER — ALBUTEROL SULFATE 90 UG/1
2 AEROSOL, METERED RESPIRATORY (INHALATION) EVERY 4 HOURS PRN
Qty: 1 G | Refills: 0 | Status: SHIPPED | OUTPATIENT
Start: 2024-04-11

## 2024-04-11 NOTE — TELEPHONE ENCOUNTER
Called pt, she stated she is updating her insurance w/ [employer] w/ the Hub, changed to [employer]. Pt will come P/U monitor and I made her aware.     I advised pt that she should contact PCP for RF, as it's non-cardiac. She stated she was unsure if her insurance will cover her anymore either. I stated I would send a message to Jr and see if we can fill albuterol until she sees Chiraa.

## 2024-04-11 NOTE — TELEPHONE ENCOUNTER
Per chart review:  Mariely Gibbons RegSched Rep     AW    4/8/24  5:16 PM  Note      Lvm for pt to return call to see if she got a different insurance than [facility] HMO. Also to see if pt is still wanting to wear another monitor? If she does, need to know if pt is coming by office to  or if she wants it mailed to her?            Pt also called back after her 1st message:  Justyn Fatima RegSched Rep routed conversation to [facility]14 minutes ago (3:03 PM)     Justyn Fatima RegSched Rep14 minutes ago (3:03 PM)     LAURITA     Caller: Dara Lunsford     Relationship: Self     Best call back number: [phone]     What is the best time to reach you: ANY     Who are you requesting to speak with (clinical staff, provider,  specific staff member): CLINICAL        What was the call regarding: PT WILL BE SEEING DR. ARCHER IN JUNE, BUT UNTIL THEN SHE WILL BE RUNNING OUT OF HER INHALER MEDICATION AND WAS WONDERING IF BRI WOULD BE FILLING TO FILL THIS SCRIPT FOR HER UNTIL SHE CAN COME IN AND SEE DR. ARCHER AND HAVE HER TAKE OVER THE PRESCRIPTION REFILLS THEN.     THESE WILL BE SENT TO THE [facility] IN [place].

## 2024-04-11 NOTE — TELEPHONE ENCOUNTER
Caller: Isatu Dara    Relationship: Self    Best call back number: [phone]    What is the best time to reach you: ANY    Who are you requesting to speak with (clinical staff, provider,  specific staff member): CLINICAL; AMBERLY      What was the call regarding: PT WOULD LIKE TO DO ANOTHER MONITOR AND WOULD BE OK WITH PICKING IT UP. PLEASE REACH OUT TO HER AND LET HER KNOW WHEN SHE CAN GRAB IT. THANK YOU.

## 2024-05-06 RX ORDER — ALBUTEROL SULFATE 90 UG/1
2 AEROSOL, METERED RESPIRATORY (INHALATION) EVERY 4 HOURS PRN
Qty: 18 G | Refills: 1 | Status: SHIPPED | OUTPATIENT
Start: 2024-05-06

## 2024-05-31 ENCOUNTER — TELEPHONE (OUTPATIENT)
Dept: CARDIOLOGY | Facility: CLINIC | Age: 59
End: 2024-05-31
Payer: MEDICARE

## 2024-05-31 DIAGNOSIS — R07.2 PRECORDIAL PAIN: ICD-10-CM

## 2024-05-31 RX ORDER — ISOSORBIDE MONONITRATE 60 MG/1
60 TABLET, EXTENDED RELEASE ORAL DAILY
Qty: 30 TABLET | Refills: 5 | Status: SHIPPED | OUTPATIENT
Start: 2024-05-31

## 2024-05-31 NOTE — TELEPHONE ENCOUNTER
RELAY    LEFT MESSAGE WITH MALE WHO IS NOT ON HIPAA FOR PT TO CALL BACK TO R/S APPT. PROVIDER WILL BE OUT OF OFFICE, PLEASE R/S TO NEXT AVAILABLE, WILL BE IN SEPTEMBER WITH BRI MAY. THANK YOU.

## 2024-05-31 NOTE — TELEPHONE ENCOUNTER
Caller: Dara Lunsford    Relationship: Self    Best call back number: [phone]    Requested Prescriptions:   Requested Prescriptions     Pending Prescriptions Disp Refills    isosorbide mononitrate (IMDUR) 60 MG 24 hr tablet 30 tablet 6     Sig: Take 1 tablet by mouth Daily.        Pharmacy where request should be sent: [id number] - [place], [place] - [address] AT [facility] [address] - [phone]  - [phone] FX     Last office visit with prescribing clinician: 12/12/2023   Last telemedicine visit with prescribing clinician: Visit date not found   Next office visit with prescribing clinician: 9/4/2024     Additional details provided by patient:     Does the patient have less than a 3 day supply:  [x] Yes  [] No    Would you like a call back once the refill request has been completed: [] Yes [x] No    If the office needs to give you a call back, can they leave a voicemail: [] Yes [x] No    Riya Duarte Rep   05/31/24 11:06 EDT

## 2024-05-31 NOTE — TELEPHONE ENCOUNTER
Name: Dara Lunsford      Relationship: Self      Best Callback Number: [phone]      HUB PROVIDED THE RELAY MESSAGE FROM THE OFFICE      PATIENT: VOICED UNDERSTANDING AND HAS NO FURTHER QUESTIONS AT THIS TIME    ADDITIONAL INFORMATION:

## 2024-06-20 RX ORDER — ALBUTEROL SULFATE 90 UG/1
2 AEROSOL, METERED RESPIRATORY (INHALATION) EVERY 4 HOURS PRN
Qty: 18 G | Refills: 1 | Status: SHIPPED | OUTPATIENT
Start: 2024-06-20

## 2024-06-27 ENCOUNTER — TELEPHONE (OUTPATIENT)
Dept: CARDIOLOGY | Facility: CLINIC | Age: 59
End: 2024-06-27
Payer: MEDICARE

## 2024-06-27 NOTE — TELEPHONE ENCOUNTER
Pt Lvm requesting her monitor results.       Per chart review, it shows in processing/needs signed by Dr. Astorga.

## 2024-07-11 ENCOUNTER — TELEPHONE (OUTPATIENT)
Dept: CARDIOLOGY | Facility: CLINIC | Age: 59
End: 2024-07-11
Payer: MEDICARE

## 2024-07-11 DIAGNOSIS — R07.89 CHEST PAIN, ATYPICAL: Primary | ICD-10-CM

## 2024-07-11 DIAGNOSIS — R42 DIZZY: ICD-10-CM

## 2024-07-11 DIAGNOSIS — R07.2 PRECORDIAL PAIN: ICD-10-CM

## 2024-07-11 DIAGNOSIS — R06.02 SHORTNESS OF BREATH: ICD-10-CM

## 2024-07-11 DIAGNOSIS — R00.2 PALPITATIONS: ICD-10-CM

## 2024-07-11 NOTE — TELEPHONE ENCOUNTER
Patient is going to try to complete her xray at [facility] in the AM. Advised we would reach out to her with ECHO and stress schedule    ----- Message from Trixie DE LEON sent at 7/11/2024  9:34 AM EDT -----    ----- Message -----  From: Guido Valentino APRN  Sent: 7/10/2024   9:03 AM EDT  To: Trixie Baumann MA    1.  Symptoms: No reported symptoms  2.  Predominant rhythm: Sinus rhythm  3.  Noted arrhythmias:  -Minimal PAC burden  -Minimal PVC burden  -No sustained ectopy, dysrhythmia, or block

## 2024-07-29 RX ORDER — ALBUTEROL SULFATE 90 UG/1
2 AEROSOL, METERED RESPIRATORY (INHALATION) EVERY 4 HOURS PRN
Qty: 18 G | Refills: 1 | Status: SHIPPED | OUTPATIENT
Start: 2024-07-29

## 2024-08-26 RX ORDER — ALBUTEROL SULFATE 90 UG/1
2 AEROSOL, METERED RESPIRATORY (INHALATION) EVERY 4 HOURS PRN
Qty: 18 G | Refills: 1 | Status: SHIPPED | OUTPATIENT
Start: 2024-08-26

## 2024-09-04 ENCOUNTER — TELEPHONE (OUTPATIENT)
Dept: CARDIOLOGY | Facility: CLINIC | Age: 59
End: 2024-09-04

## 2024-09-04 NOTE — TELEPHONE ENCOUNTER
Caller: Dara Lunsford    Relationship to patient: Self    Best call back number: [phone] (home)     Chief complaint: CHEST PAIN - NOT OCCURRING AT TIME OF THIS CALL    Type of visit: FU    Requested date: ASAP    Additional notes: R/S TO NEXT AVAILABLE APPT 2.13.25 ,BEYOND 3 WEEKS  . IF PT SHOULD BE SEEN SOONER, PLEASE CALL THEM TO RESCHEDULE.

## 2024-10-28 ENCOUNTER — TELEPHONE (OUTPATIENT)
Dept: PULMONOLOGY | Facility: CLINIC | Age: 59
End: 2024-10-28

## 2024-10-28 RX ORDER — ALBUTEROL SULFATE 90 UG/1
2 INHALANT RESPIRATORY (INHALATION) EVERY 4 HOURS PRN
Qty: 18 G | Refills: 1 | Status: SHIPPED | OUTPATIENT
Start: 2024-10-28

## 2024-10-28 NOTE — TELEPHONE ENCOUNTER
Caller: Dara Lunsford    Relationship: Self    Best call back number: [phone]    What is the best time to reach you: ANYTIME    Who are you requesting to speak with (clinical staff, provider,  specific staff member): CLINICAL STAFF         What was the call regarding: PT IS SCHEDULED FOR A NEW PATIENT APPT WITH CHELLE BUT PT DOESN'T HAVE ANY INHALERS AND IS WANTING TO SEE IF SHE WILL PUT IN A SCRIPT FOR HER. I INFORMED PATIENT THAT SHE'S A NEW PATIENT AND SHE MIGHT NOT FILL MEDICATION. PLEASE CALL AND ADVISE

## 2025-01-14 NOTE — TELEPHONE ENCOUNTER
I will have JR look over lung ventilation scan and give me recommendations. I will call patient back.   4